# Patient Record
Sex: FEMALE | Race: WHITE | NOT HISPANIC OR LATINO | Employment: UNEMPLOYED | URBAN - METROPOLITAN AREA
[De-identification: names, ages, dates, MRNs, and addresses within clinical notes are randomized per-mention and may not be internally consistent; named-entity substitution may affect disease eponyms.]

---

## 2017-04-11 ENCOUNTER — TELEPHONE (OUTPATIENT)
Dept: OBSTETRICS AND GYNECOLOGY | Facility: CLINIC | Age: 37
End: 2017-04-11

## 2017-04-11 RX ORDER — VALACYCLOVIR HYDROCHLORIDE 500 MG/1
500 TABLET, FILM COATED ORAL 2 TIMES DAILY
Qty: 14 TABLET | Refills: 3 | Status: SHIPPED | OUTPATIENT
Start: 2017-04-11 | End: 2017-04-18

## 2017-04-11 NOTE — TELEPHONE ENCOUNTER
Kaylynn,     Medication sent into pharmacy.   Please let her know.     Thanks   Dr. Demarco    ----- Message from Debbie Jacques sent at 4/11/2017  2:03 PM EDT -----  Contact: Patient   Patient requesting RF for Valtex for her fever blisters.  Patient states can't remember the milligram but takes it once a day until fever blister/symptoms resolve.  Patient states has one on her lip currently and one coming in right above her lip as well.      Thanks,  Debbie

## 2017-12-18 ENCOUNTER — TELEPHONE (OUTPATIENT)
Dept: OBSTETRICS AND GYNECOLOGY | Facility: CLINIC | Age: 37
End: 2017-12-18

## 2017-12-18 RX ORDER — METRONIDAZOLE 500 MG/1
500 TABLET ORAL 2 TIMES DAILY
Qty: 14 TABLET | Refills: 4 | Status: SHIPPED | OUTPATIENT
Start: 2017-12-18 | End: 2017-12-25

## 2017-12-18 NOTE — TELEPHONE ENCOUNTER
Kaylynn,     Medication sent to the pharmacy     Thanks   Dr. Demarco    ----- Message from Chrissie Hernandez sent at 12/18/2017  8:46 AM EST -----  Pt is requesting Rx for BV, please advise.

## 2018-01-16 ENCOUNTER — TELEPHONE (OUTPATIENT)
Dept: OBSTETRICS AND GYNECOLOGY | Facility: CLINIC | Age: 38
End: 2018-01-16

## 2018-01-16 NOTE — TELEPHONE ENCOUNTER
Kaylynn,     One refill sent.     Please let her know.     Thanks   Dr. Demarco    ----- Message from Shona Kam sent at 1/16/2018 11:38 AM EST -----  Contact: Pt  Pt had to r/s annual to 2/16/18.  Requesting one month refill on norgestrel-ethinyl estradiol (LOW-OGESTREL,CRYSELLE) 0.3-30 MG-MCG per tablet to get her through.      Pt # [phone]

## 2018-02-07 ENCOUNTER — TELEPHONE (OUTPATIENT)
Dept: OBSTETRICS AND GYNECOLOGY | Facility: CLINIC | Age: 38
End: 2018-02-07

## 2018-02-07 NOTE — TELEPHONE ENCOUNTER
Chrissie,     Two issues.     1) She needs an annual appointment - overdue at this point  2) No pharmacy in [facility] so you also need a phone number for the drug to even be called in.     Thanks   Dr. Demarco    ----- Message from Chrissie Hernandez sent at 2/7/2018 11:18 AM EST -----  Pt requesting refill on norgestrel-ethinyl estradiol (LOW-OGESTREL,CRYSELLE) 0.3-30 MG-MCG per tablet, please advise.       Kaylynn,     I got an answer back from Chrissie.   Now with appointment for 3/6/18   Need to call in one refill, I do not see pharmacy in chart despite what Chrissie says??    Thanks   Dr. Demarco

## 2018-03-22 ENCOUNTER — TELEPHONE (OUTPATIENT)
Dept: OBSTETRICS AND GYNECOLOGY | Facility: CLINIC | Age: 38
End: 2018-03-22

## 2018-03-22 RX ORDER — ACETAMINOPHEN AND CODEINE PHOSPHATE 120; 12 MG/5ML; MG/5ML
1 SOLUTION ORAL DAILY
Qty: 28 TABLET | Refills: 0 | Status: SHIPPED | OUTPATIENT
Start: 2018-03-22 | End: 2018-07-18

## 2018-03-22 NOTE — TELEPHONE ENCOUNTER
Kaylynn,     I changed her to Micronor.  Last documentation showsw she is a smoker, > 36 years of age. Can not refill OCP at this point.  Needs to be sure and take her micronor at ath same time or if off by more than 4 hours - risk failure.     Please let her know.     Thanks   Dr. Demarco    ----- Message from Debbie Jacques sent at 3/22/2018  9:38 AM EDT -----  Contact: Patient   Patient is requesting a RF of her Low-Ogestrel.  Patient has rescheduled her appointment to 4/16/18.  She has rescheduled numerous times.

## 2018-07-18 ENCOUNTER — OFFICE VISIT (OUTPATIENT)
Dept: OBSTETRICS AND GYNECOLOGY | Facility: CLINIC | Age: 38
End: 2018-07-18

## 2018-07-18 VITALS
BODY MASS INDEX: 37.38 KG/M2 | HEART RATE: 64 BPM | SYSTOLIC BLOOD PRESSURE: 126 MMHG | WEIGHT: 198 LBS | HEIGHT: 61 IN | DIASTOLIC BLOOD PRESSURE: 86 MMHG

## 2018-07-18 DIAGNOSIS — Z01.419 ENCOUNTER FOR GYNECOLOGICAL EXAMINATION WITHOUT ABNORMAL FINDING: Primary | ICD-10-CM

## 2018-07-18 DIAGNOSIS — Z72.0 TOBACCO USE: ICD-10-CM

## 2018-07-18 DIAGNOSIS — N89.8 VAGINAL ODOR: ICD-10-CM

## 2018-07-18 DIAGNOSIS — Z30.013 ENCOUNTER FOR INITIAL PRESCRIPTION OF INJECTABLE CONTRACEPTIVE: ICD-10-CM

## 2018-07-18 PROCEDURE — 99395 PREV VISIT EST AGE 18-39: CPT | Performed by: OBSTETRICS & GYNECOLOGY

## 2018-07-18 PROCEDURE — 99406 BEHAV CHNG SMOKING 3-10 MIN: CPT | Performed by: OBSTETRICS & GYNECOLOGY

## 2018-07-18 RX ORDER — MEDROXYPROGESTERONE ACETATE 150 MG/ML
150 INJECTION, SUSPENSION INTRAMUSCULAR
Qty: 1 ML | Refills: 3 | Status: SHIPPED | OUTPATIENT
Start: 2018-07-18 | End: 2019-12-03

## 2018-07-18 NOTE — PATIENT INSTRUCTIONS
Steps to Quit Smoking  Smoking tobacco can be harmful to your health and can affect almost every organ in your body. Smoking puts you, and those around you, at risk for developing many serious chronic diseases. Quitting smoking is difficult, but it is one of the best things that you can do for your health. It is never too late to quit.  What are the benefits of quitting smoking?  When you quit smoking, you lower your risk of developing serious diseases and conditions, such as:  · Lung cancer or lung disease, such as COPD.  · Heart disease.  · Stroke.  · Heart attack.  · Infertility.  · Osteoporosis and bone fractures.    Additionally, symptoms such as coughing, wheezing, and shortness of breath may get better when you quit. You may also find that you get sick less often because your body is stronger at fighting off colds and infections. If you are pregnant, quitting smoking can help to reduce your chances of having a baby of low birth weight.  How do I get ready to quit?  When you decide to quit smoking, create a plan to make sure that you are successful. Before you quit:  · Pick a date to quit. Set a date within the next two weeks to give you time to prepare.  · Write down the reasons why you are quitting. Keep this list in places where you will see it often, such as on your bathroom mirror or in your car or wallet.  · Identify the people, places, things, and activities that make you want to smoke (triggers) and avoid them. Make sure to take these actions:  ? Throw away all cigarettes at home, at work, and in your car.  ? Throw away smoking accessories, such as ashtrays and lighters.  ? Clean your car and make sure to empty the ashtray.  ? Clean your home, including curtains and carpets.  · Tell your family, friends, and coworkers that you are quitting. Support from your loved ones can make quitting easier.  · Talk with your health care provider about your options for quitting smoking.  · Find out what  treatment options are covered by your health insurance.    What strategies can I use to quit smoking?  Talk with your healthcare provider about different strategies to quit smoking. Some strategies include:  · Quitting smoking altogether instead of gradually lessening how much you smoke over a period of time. Research shows that quitting “cold turkey” is more successful than gradually quitting.  · Attending in-person counseling to help you build problem-solving skills. You are more likely to have success in quitting if you attend several counseling sessions. Even short sessions of 10 minutes can be effective.  · Finding resources and support systems that can help you to quit smoking and remain smoke-free after you quit. These resources are most helpful when you use them often. They can include:  ? Online chats with a counselor.  ? Telephone quitlines.  ? Printed self-help materials.  ? Support groups or group counseling.  ? Text messaging programs.  ? Mobile phone applications.  · Taking medicines to help you quit smoking. (If you are pregnant or breastfeeding, talk with your health care provider first.) Some medicines contain nicotine and some do not. Both types of medicines help with cravings, but the medicines that include nicotine help to relieve withdrawal symptoms. Your health care provider may recommend:  ? Nicotine patches, gum, or lozenges.  ? Nicotine inhalers or sprays.  ? Non-nicotine medicine that is taken by mouth.    Talk with your health care provider about combining strategies, such as taking medicines while you are also receiving in-person counseling. Using these two strategies together makes you more likely to succeed in quitting than if you used either strategy on its own.  If you are pregnant or breastfeeding, talk with your health care provider about finding counseling or other support strategies to quit smoking. Do not take medicine to help you quit smoking unless told to do so by your health  care provider.  What things can I do to make it easier to quit?  Quitting smoking might feel overwhelming at first, but there is a lot that you can do to make it easier. Take these important actions:  · Reach out to your family and friends and ask that they support and encourage you during this time. Call telephone quitlines, reach out to support groups, or work with a counselor for support.  · Ask people who smoke to avoid smoking around you.  · Avoid places that trigger you to smoke, such as bars, parties, or smoke-break areas at work.  · Spend time around people who do not smoke.  · Lessen stress in your life, because stress can be a smoking trigger for some people. To lessen stress, try:  ? Exercising regularly.  ? Deep-breathing exercises.  ? Yoga.  ? Meditating.  ? Performing a body scan. This involves closing your eyes, scanning your body from head to toe, and noticing which parts of your body are particularly tense. Purposefully relax the muscles in those areas.  · Download or purchase mobile phone or tablet apps (applications) that can help you stick to your quit plan by providing reminders, tips, and encouragement. There are many free apps, such as QuitGuide from the CDC (Centers for Disease Control and Prevention). You can find other support for quitting smoking (smoking cessation) through smokefree.gov and other websites.    How will I feel when I quit smoking?  Within the first 24 hours of quitting smoking, you may start to feel some withdrawal symptoms. These symptoms are usually most noticeable 2-3 days after quitting, but they usually do not last beyond 2-3 weeks. Changes or symptoms that you might experience include:  · Mood swings.  · Restlessness, anxiety, or irritation.  · Difficulty concentrating.  · Dizziness.  · Strong cravings for sugary foods in addition to nicotine.  · Mild weight gain.  · Constipation.  · Nausea.  · Coughing or a sore throat.  · Changes in how your medicines work in your  body.  · A depressed mood.  · Difficulty sleeping (insomnia).    After the first 2-3 weeks of quitting, you may start to notice more positive results, such as:  · Improved sense of smell and taste.  · Decreased coughing and sore throat.  · Slower heart rate.  · Lower blood pressure.  · Clearer skin.  · The ability to breathe more easily.  · Fewer sick days.    Quitting smoking is very challenging for most people. Do not get discouraged if you are not successful the first time. Some people need to make many attempts to quit before they achieve long-term success. Do your best to stick to your quit plan, and talk with your health care provider if you have any questions or concerns.  This information is not intended to replace advice given to you by your health care provider. Make sure you discuss any questions you have with your health care provider.  Document Released: 12/12/2002 Document Revised: 08/15/2017 Document Reviewed: 05/03/2016  [employer] Interactive Patient Education © 2018 [employer]

## 2018-07-18 NOTE — PROGRESS NOTES
"GYN Annual Exam     CC- Here for annual exam.     Laisha Alaniz is a 38 y.o. female who presents for annual well woman exam. Periods are regular every 28-30 days, lasting 7 days. Dysmenorrhea:mild, occurring first 1-2 days of flow. Cyclic symptoms include none. No intermenstrual bleeding, spotting, or discharge.   Pt does notice a vaginal odor. Denies any d/c or burning. Pt would like to discuss birth control.     OB History      Para Term  AB Living    3 3 3     3    SAB TAB Ectopic Molar Multiple Live Births            0 3          Current contraception: none  History of abnormal Pap smear: no  Family history of uterine, colon or ovarian cancer: no  History of abnormal mammogram: no  Family history of breast cancer: yes - maternal grandmother   Last Pap : 03/15/2016 NL HPV-    Past Medical History:   Diagnosis Date   • Abnormal Pap smear of cervix    • Chlamydia    • Gonorrhea    • Migraine    • Migraines    • MRSA (methicillin resistant Staphylococcus aureus) 2015   • Varicella        Past Surgical History:   Procedure Laterality Date   •  SECTION     • WISDOM TOOTH EXTRACTION         No current outpatient prescriptions on file.    No Known Allergies    Social History   Substance Use Topics   • Smoking status: Current Every Day Smoker     Packs/day: 0.25     Types: Cigarettes   • Smokeless tobacco: Never Used   • Alcohol use No       Family History   Problem Relation Age of Onset   • Breast cancer Maternal Grandmother    • Ovarian cancer Neg Hx    • Uterine cancer Neg Hx    • Colon cancer Neg Hx    • Deep vein thrombosis Neg Hx    • Pulmonary embolism Neg Hx        Review of Systems   Constitutional: Negative for chills and fever.   Gastrointestinal: Negative for abdominal pain.   Genitourinary: Negative for dysuria, menstrual problem, pelvic pain, vaginal bleeding and vaginal discharge.   All other systems reviewed and are negative.      /86   Pulse 64   Ht 154.9 cm (61\")   Wt " 89.8 kg (198 lb)   LMP 06/27/2018   Breastfeeding? No   BMI 37.41 kg/m²     Physical Exam   Constitutional: She is oriented to person, place, and time. She appears well-developed and well-nourished.   HENT:   Head: Normocephalic and atraumatic.   Eyes: Conjunctivae are normal.   Neck: Normal range of motion. Neck supple. No thyromegaly present.   Cardiovascular: Normal rate and regular rhythm.    No murmur heard.  Pulmonary/Chest: Effort normal and breath sounds normal. Right breast exhibits no inverted nipple, no mass and no nipple discharge. Left breast exhibits no inverted nipple, no mass and no nipple discharge.   Abdominal: Soft. Bowel sounds are normal. She exhibits no distension. There is no tenderness.   Genitourinary: Vagina normal and uterus normal. Pelvic exam was performed with patient supine. There is no lesion on the right labia. There is no lesion on the left labia. Uterus is not enlarged (anteverted ), not fixed and not tender. Cervix exhibits no motion tenderness. Right adnexum displays no mass and no tenderness. Left adnexum displays no mass and no tenderness. No bleeding in the vagina. No vaginal discharge found.   Musculoskeletal: She exhibits no edema.   Lymphadenopathy:        Right: No inguinal adenopathy present.        Left: No inguinal adenopathy present.   Neurological: She is alert and oriented to person, place, and time.   Skin: No rash noted.   Psychiatric: She has a normal mood and affect. Her behavior is normal.          Assessment     1) GYN annual well woman exam.   2) vaginal odor  3) Depo-provera for contraception   Not eligible for OCP due to tobacco use.   Adjustment bleeding, weight gain and amenorrhea reviewed.   4) Tobacco   Tobacco abuse addressed    1) Increases risk for heart disease, COPD and lung cancer   2) many ways to stop including hypnosis, cold turkey, weaning but if interested in   Patches, nicotine gum, or medications like zyban and Chantix will need to see  PCP  3) Recommend weaning as ideal way to try and reduce risk to stop   Typically encourage to set goals every two weeks with a reduction by 50% in use of tobacco. Once down to a few a day, set quit day in the same manor.   4) [place] quit now is a resource available to all.     I spent > 3 min face to face counseling about the use of tobacco abuse and why to quit.           Plan     1) Breast Health - Clinical breast exam yearly, Self breast awareness monthly  2) Pap - up to date   3) Smoking status- see above   4) Seat belts recommended  5) Follow up prn and one year.     Garcia Demarco MD   7/18/2018  9:44 AM

## 2018-07-25 ENCOUNTER — TELEPHONE (OUTPATIENT)
Dept: OBSTETRICS AND GYNECOLOGY | Facility: CLINIC | Age: 38
End: 2018-07-25

## 2018-07-25 LAB
A VAGINAE DNA VAG QL NAA+PROBE: NORMAL SCORE
BVAB2 DNA VAG QL NAA+PROBE: NORMAL SCORE
C ALBICANS DNA VAG QL NAA+PROBE: NEGATIVE
C GLABRATA DNA VAG QL NAA+PROBE: NEGATIVE
C TRACH RRNA SPEC QL NAA+PROBE: NEGATIVE
MEGA1 DNA VAG QL NAA+PROBE: NORMAL SCORE
N GONORRHOEA RRNA SPEC QL NAA+PROBE: NEGATIVE
T VAGINALIS RRNA SPEC QL NAA+PROBE: NEGATIVE

## 2018-07-25 NOTE — TELEPHONE ENCOUNTER
----- Message from Garcia Demarco MD sent at 7/25/2018 11:26 AM EDT -----  Shona, negative vaginal culture please review. Thanks Dr. Demarco

## 2018-08-01 ENCOUNTER — CLINICAL SUPPORT (OUTPATIENT)
Dept: OBSTETRICS AND GYNECOLOGY | Facility: CLINIC | Age: 38
End: 2018-08-01

## 2018-08-01 VITALS
WEIGHT: 199 LBS | HEIGHT: 61 IN | DIASTOLIC BLOOD PRESSURE: 81 MMHG | SYSTOLIC BLOOD PRESSURE: 125 MMHG | HEART RATE: 74 BPM | BODY MASS INDEX: 37.57 KG/M2

## 2018-08-01 DIAGNOSIS — Z30.013 INITIATION OF DEPO PROVERA: Primary | ICD-10-CM

## 2018-08-01 LAB
B-HCG UR QL: NEGATIVE
INTERNAL NEGATIVE CONTROL: NEGATIVE
INTERNAL POSITIVE CONTROL: POSITIVE
Lab: NORMAL

## 2018-08-01 PROCEDURE — 81025 URINE PREGNANCY TEST: CPT | Performed by: OBSTETRICS & GYNECOLOGY

## 2018-08-01 PROCEDURE — 96372 THER/PROPH/DIAG INJ SC/IM: CPT | Performed by: OBSTETRICS & GYNECOLOGY

## 2018-08-01 RX ORDER — MEDROXYPROGESTERONE ACETATE 150 MG/ML
150 INJECTION, SUSPENSION INTRAMUSCULAR ONCE
Status: COMPLETED | OUTPATIENT
Start: 2018-08-01 | End: 2018-08-01

## 2018-08-01 RX ADMIN — MEDROXYPROGESTERONE ACETATE 150 MG: 150 INJECTION, SUSPENSION INTRAMUSCULAR at 09:38

## 2019-12-03 ENCOUNTER — INITIAL PRENATAL (OUTPATIENT)
Dept: OBSTETRICS AND GYNECOLOGY | Facility: CLINIC | Age: 39
End: 2019-12-03

## 2019-12-03 VITALS — DIASTOLIC BLOOD PRESSURE: 77 MMHG | SYSTOLIC BLOOD PRESSURE: 132 MMHG | BODY MASS INDEX: 32.12 KG/M2 | WEIGHT: 170 LBS

## 2019-12-03 DIAGNOSIS — O99.211 OBESITY AFFECTING PREGNANCY IN FIRST TRIMESTER: ICD-10-CM

## 2019-12-03 DIAGNOSIS — O99.331 TOBACCO USE IN PREGNANCY, ANTEPARTUM, FIRST TRIMESTER: ICD-10-CM

## 2019-12-03 DIAGNOSIS — O34.219 PREVIOUS CESAREAN DELIVERY, ANTEPARTUM: ICD-10-CM

## 2019-12-03 DIAGNOSIS — Z11.3 SCREEN FOR STD (SEXUALLY TRANSMITTED DISEASE): ICD-10-CM

## 2019-12-03 DIAGNOSIS — Z12.4 SCREENING FOR CERVICAL CANCER: ICD-10-CM

## 2019-12-03 DIAGNOSIS — Z72.0 TOBACCO USE: ICD-10-CM

## 2019-12-03 DIAGNOSIS — O09.521 AMA (ADVANCED MATERNAL AGE) MULTIGRAVIDA 35+, FIRST TRIMESTER: Primary | ICD-10-CM

## 2019-12-03 PROCEDURE — 99214 OFFICE O/P EST MOD 30 MIN: CPT | Performed by: OBSTETRICS & GYNECOLOGY

## 2019-12-03 RX ORDER — VITAMIN A ACETATE, BETA CAROTENE, ASCORBIC ACID, CHOLECALCIFEROL, .ALPHA.-TOCOPHEROL ACETATE, DL-, THIAMINE MONONITRATE, RIBOFLAVIN, NIACINAMIDE, PYRIDOXINE HYDROCHLORIDE, FOLIC ACID, CYANOCOBALAMIN, CALCIUM CARBONATE, FERROUS FUMARATE, ZINC OXIDE, CUPRIC OXIDE 3080; 12; 120; 400; 1; 1.84; 3; 20; 22; 920; 25; 200; 27; 10; 2 [IU]/1; UG/1; MG/1; [IU]/1; MG/1; MG/1; MG/1; MG/1; MG/1; [IU]/1; MG/1; MG/1; MG/1; MG/1; MG/1
1 TABLET, FILM COATED ORAL DAILY
Qty: 30 TABLET | Refills: 12 | Status: SHIPPED | OUTPATIENT
Start: 2019-12-03 | End: 2020-03-27 | Stop reason: SDUPTHER

## 2019-12-03 RX ORDER — VITAMIN A ACETATE, BETA CAROTENE, ASCORBIC ACID, CHOLECALCIFEROL, .ALPHA.-TOCOPHEROL ACETATE, DL-, THIAMINE MONONITRATE, RIBOFLAVIN, NIACINAMIDE, PYRIDOXINE HYDROCHLORIDE, FOLIC ACID, CYANOCOBALAMIN, CALCIUM CARBONATE, FERROUS FUMARATE, ZINC OXIDE, CUPRIC OXIDE 3080; 12; 120; 400; 1; 1.84; 3; 20; 22; 920; 25; 200; 27; 10; 2 [IU]/1; UG/1; MG/1; [IU]/1; MG/1; MG/1; MG/1; MG/1; MG/1; [IU]/1; MG/1; MG/1; MG/1; MG/1; MG/1
TABLET, FILM COATED ORAL DAILY
COMMUNITY
End: 2019-12-03 | Stop reason: SDUPTHER

## 2019-12-03 RX ORDER — FAMOTIDINE 40 MG/1
TABLET, FILM COATED ORAL
Refills: 0 | COMMUNITY
Start: 2019-10-29 | End: 2020-02-21 | Stop reason: SDUPTHER

## 2019-12-03 NOTE — PROGRESS NOTES
Initial ob visit     CC- Here for care of pregnancy     Laisha Alaniz is being seen today for her first obstetrical visit.  She is a 39 y.o.    12w4d gestation.     #: 1, Date: 03, Sex: Female, Weight: 3742 g (8 lb 4 oz), GA: 40w0d, Delivery: Vaginal, Spontaneous, Apgar1: None, Apgar5: None, Living: Living, Birth Comments: None    #: 2, Date: 11/15/03, Sex: Female, Weight: 3487 g (7 lb 11 oz), GA: 40w0d, Delivery: Vaginal, Spontaneous, Apgar1: None, Apgar5: None, Living: Living, Birth Comments: None    #: 3, Date: 11/10/16, Sex: Female, Weight: 3145 g (6 lb 14.9 oz), GA: 40w2d, Delivery: , Low Transverse, Apgar1: 8, Apgar5: 9, Living: Living, Birth Comments: None    #: 4, Date: None, Sex: None, Weight: None, GA: None, Delivery: None, Apgar1: None, Apgar5: None, Living: None, Birth Comments: None      Current obstetric complaints : acid reflux, recent US showing 13 weeks.   Prior obstetric issues, potential pregnancy concerns:  with G3  Family history of genetic issues (includes FOB): child with an IQ of 49,   Prior infections concerning in pregnancy (Rash, fever in last 2 weeks): none  Varicella Hx -immunity  Prior testing for Cystic Fibrosis Carrier or Sickle Cell Trait- unknown status  Prepregnancy BMI - Body mass index is 32.12 kg/m².    Past Medical History:   Diagnosis Date   • Abnormal Pap smear of cervix    • Chlamydia    • Gonorrhea    • Migraine    • Migraines    • MRSA (methicillin resistant Staphylococcus aureus) 2015   • PID (pelvic inflammatory disease)    • Varicella        Past Surgical History:   Procedure Laterality Date   •  SECTION     • WISDOM TOOTH EXTRACTION           Current Outpatient Medications:   •  famotidine (PEPCID) 40 MG tablet, TK 1 T PO QHS, Disp: , Rfl: 0  •  mupirocin (BACTROBAN) 2 % ointment, APPLY 1 APPLICATION TO AFFECTED AREA TID, Disp: , Rfl: 0  •  prenatal vitamins (PRENATAL 27-) 27-1 MG tablet tablet, Take  by mouth Daily., Disp:  , Rfl:     No Known Allergies    Social History     Socioeconomic History   • Marital status:      Spouse name: Not on file   • Number of children: Not on file   • Years of education: Not on file   • Highest education level: Not on file   Tobacco Use   • Smoking status: Current Every Day Smoker     Packs/day: 0.25     Types: Cigarettes   • Smokeless tobacco: Never Used   Substance and Sexual Activity   • Alcohol use: No   • Drug use: No   • Sexual activity: Yes     Partners: Male     Birth control/protection: None       Family History   Problem Relation Age of Onset   • Breast cancer Maternal Grandmother    • Ovarian cancer Neg Hx    • Uterine cancer Neg Hx    • Colon cancer Neg Hx    • Deep vein thrombosis Neg Hx    • Pulmonary embolism Neg Hx        Review of systems     A comprehensive review of systems was negative.     Objective    /77   Wt 77.1 kg (170 lb)   LMP 09/06/2019 (Exact Date)   BMI 32.12 kg/m²       General Appearance:    Alert, cooperative, in no acute distress, habitus obese   Head:    Normocephalic, without obvious abnormality, atraumatic   Eyes:            Lids and lashes normal, conjunctivae and sclerae normal, no   icterus, no pallor, corneas clear   Ears:    Ears appear intact with no abnormalities noted       Neck:   No adenopathy, supple, trachea midline, no thyromegaly   Back:     No kyphosis present, no scoliosis present,                       Lungs:     Clear to auscultation,respirations regular, even and                   unlabored    Heart:    Regular rhythm and normal rate, normal S1 and S2, no            murmur, no gallop, no rub, no click   Breast Exam:    No masses, No nipple discharge   Abdomen:     Normal bowel sounds, no masses, no organomegaly, soft        non-tender, non-distended, no guarding, no rebound                 tenderness   Genitalia:    Vulva - BUS-WNL, NEFG    Vagina - No discharge, No bleeding    Cervix - No Lesions, closed     Uterus - Consistent  with 12-14 weeks, +FHTs    Adnexa - No mass, NT    Pelvimetry - clinically adequate, gynecoid pelvis     Extremities:   Moves all extremities well, no edema, no cyanosis, no              redness   Pulses:   Pulses palpable and equal bilaterally   Skin:   No bleeding, bruising or rash   Lymph nodes:   No palpable adenopathy   Neurologic:   Sensation intact, A&O times 3     Reviewed ultrasound 19 at [facility]   Consistent with 13 weeks and 2 days, SIUP +FHTs      Assessment    1) Pregnancy at 12w4d  2) AMA   Discussed and to proceed with NIPT   3) Prior    Discussed  vs repeat, possible candidate will assess down the road   4) Obesity in pregnancy   Encouraged to limit gain to 10-20#   5) Tobacco use in pregnancy   Tobacco use in pregnancy addressed   1) Increases risk for IUGR, abruption   2) Infant will be exposed and therefor withdrawal once delivered and the cord is cut  3) Recommend weaning as ideal way to try and reduce risk to stop   Typically encourage to set goals every two weeks with a reduction by 50% in use of tobacco. Once down to a few a day, set quit day in the same manor.            Plan    Initial labs drawn, GC/CHL screen done  Activity recommendation : 150 minutes/week of moderate intensity aerobic activity unless we limit for bleeding, hypertension or other pregnancy complication   Patient is on Prenatal vitamins  Problem list reviewed and updated.  Reviewed routine prenatal care with the office to include but not limited to   Zika (travel restrictions/ok to use insect repellant), not to changing cat litter, food restrictions, avoidance of alcohol, tobacco and drugs and saunas/hot tubs.   All questions answered.     Garcia Demarco MD   12/3/2019  1:11 PM

## 2019-12-03 NOTE — PATIENT INSTRUCTIONS
Steps to Quit Smoking    Smoking tobacco can be harmful to your health and can affect almost every organ in your body. Smoking puts you, and those around you, at risk for developing many serious chronic diseases. Quitting smoking is difficult, but it is one of the best things that you can do for your health. It is never too late to quit.  What are the benefits of quitting smoking?  When you quit smoking, you lower your risk of developing serious diseases and conditions, such as:  · Lung cancer or lung disease, such as COPD.  · Heart disease.  · Stroke.  · Heart attack.  · Infertility.  · Osteoporosis and bone fractures.  Additionally, symptoms such as coughing, wheezing, and shortness of breath may get better when you quit. You may also find that you get sick less often because your body is stronger at fighting off colds and infections. If you are pregnant, quitting smoking can help to reduce your chances of having a baby of low birth weight.  How do I get ready to quit?  When you decide to quit smoking, create a plan to make sure that you are successful. Before you quit:  · Pick a date to quit. Set a date within the next two weeks to give you time to prepare.  · Write down the reasons why you are quitting. Keep this list in places where you will see it often, such as on your bathroom mirror or in your car or wallet.  · Identify the people, places, things, and activities that make you want to smoke (triggers) and avoid them. Make sure to take these actions:  ? Throw away all cigarettes at home, at work, and in your car.  ? Throw away smoking accessories, such as ashtrays and lighters.  ? Clean your car and make sure to empty the ashtray.  ? Clean your home, including curtains and carpets.  · Tell your family, friends, and coworkers that you are quitting. Support from your loved ones can make quitting easier.  · Talk with your health care provider about your options for quitting smoking.  · Find out what  treatment options are covered by your health insurance.  What strategies can I use to quit smoking?  Talk with your healthcare provider about different strategies to quit smoking. Some strategies include:  · Quitting smoking altogether instead of gradually lessening how much you smoke over a period of time. Research shows that quitting “cold turkey” is more successful than gradually quitting.  · Attending in-person counseling to help you build problem-solving skills. You are more likely to have success in quitting if you attend several counseling sessions. Even short sessions of 10 minutes can be effective.  · Finding resources and support systems that can help you to quit smoking and remain smoke-free after you quit. These resources are most helpful when you use them often. They can include:  ? Online chats with a counselor.  ? Telephone quitlines.  ? Printed self-help materials.  ? Support groups or group counseling.  ? Text messaging programs.  ? Mobile phone applications.  · Taking medicines to help you quit smoking. (If you are pregnant or breastfeeding, talk with your health care provider first.) Some medicines contain nicotine and some do not. Both types of medicines help with cravings, but the medicines that include nicotine help to relieve withdrawal symptoms. Your health care provider may recommend:  ? Nicotine patches, gum, or lozenges.  ? Nicotine inhalers or sprays.  ? Non-nicotine medicine that is taken by mouth.  Talk with your health care provider about combining strategies, such as taking medicines while you are also receiving in-person counseling. Using these two strategies together makes you more likely to succeed in quitting than if you used either strategy on its own.  If you are pregnant or breastfeeding, talk with your health care provider about finding counseling or other support strategies to quit smoking. Do not take medicine to help you quit smoking unless told to do so by your health care  provider.  What things can I do to make it easier to quit?  Quitting smoking might feel overwhelming at first, but there is a lot that you can do to make it easier. Take these important actions:  · Reach out to your family and friends and ask that they support and encourage you during this time. Call telephone quitlines, reach out to support groups, or work with a counselor for support.  · Ask people who smoke to avoid smoking around you.  · Avoid places that trigger you to smoke, such as bars, parties, or smoke-break areas at work.  · Spend time around people who do not smoke.  · Lessen stress in your life, because stress can be a smoking trigger for some people. To lessen stress, try:  ? Exercising regularly.  ? Deep-breathing exercises.  ? Yoga.  ? Meditating.  ? Performing a body scan. This involves closing your eyes, scanning your body from head to toe, and noticing which parts of your body are particularly tense. Purposefully relax the muscles in those areas.  · Download or purchase mobile phone or tablet apps (applications) that can help you stick to your quit plan by providing reminders, tips, and encouragement. There are many free apps, such as QuitGuide from the CDC (Centers for Disease Control and Prevention). You can find other support for quitting smoking (smoking cessation) through smokefree.gov and other websites.  How will I feel when I quit smoking?  Within the first 24 hours of quitting smoking, you may start to feel some withdrawal symptoms. These symptoms are usually most noticeable 2-3 days after quitting, but they usually do not last beyond 2-3 weeks. Changes or symptoms that you might experience include:  · Mood swings.  · Restlessness, anxiety, or irritation.  · Difficulty concentrating.  · Dizziness.  · Strong cravings for sugary foods in addition to nicotine.  · Mild weight gain.  · Constipation.  · Nausea.  · Coughing or a sore throat.  · Changes in how your medicines work in your  body.  · A depressed mood.  · Difficulty sleeping (insomnia).  After the first 2-3 weeks of quitting, you may start to notice more positive results, such as:  · Improved sense of smell and taste.  · Decreased coughing and sore throat.  · Slower heart rate.  · Lower blood pressure.  · Clearer skin.  · The ability to breathe more easily.  · Fewer sick days.  Quitting smoking is very challenging for most people. Do not get discouraged if you are not successful the first time. Some people need to make many attempts to quit before they achieve long-term success. Do your best to stick to your quit plan, and talk with your health care provider if you have any questions or concerns.  This information is not intended to replace advice given to you by your health care provider. Make sure you discuss any questions you have with your health care provider.  Document Released: 12/12/2002 Document Revised: 07/24/2018 Document Reviewed: 05/03/2016  [employer] Patient Education © 2019 [employer]

## 2019-12-05 LAB
ABO GROUP BLD: (no result)
BACTERIA UR CULT: NORMAL
BACTERIA UR CULT: NORMAL
BASOPHILS # BLD AUTO: 0 X10E3/UL (ref 0–0.2)
BASOPHILS NFR BLD AUTO: 0 %
BLD GP AB SCN SERPL QL: (no result)
BLD GP AB SCN SERPL QL: NORMAL
BLOOD GROUP ANTIBODIES SERPL: NORMAL
CYTOLOGIST CVX/VAG CYTO: NORMAL
CYTOLOGY CVX/VAG DOC CYTO: NORMAL
CYTOLOGY CVX/VAG DOC THIN PREP: NORMAL
DX ICD CODE: NORMAL
EOSINOPHIL # BLD AUTO: 0.1 X10E3/UL (ref 0–0.4)
EOSINOPHIL NFR BLD AUTO: 1 %
ERYTHROCYTE [DISTWIDTH] IN BLOOD BY AUTOMATED COUNT: 13.5 % (ref 12.3–15.4)
HBV SURFACE AG SERPL QL IA: NEGATIVE
HCT VFR BLD AUTO: 35.2 % (ref 34–46.6)
HCV AB S/CO SERPL IA: <0.1 S/CO RATIO (ref 0–0.9)
HGB BLD-MCNC: 11.9 G/DL (ref 11.1–15.9)
HIV 1 & 2 AB SER-IMP: NORMAL
HIV 1+2 AB+HIV1 P24 AG SERPL QL IA: NON REACTIVE
HPV I/H RISK 1 DNA CVX QL PROBE+SIG AMP: NORMAL
HPV I/H RISK 4 DNA CVX QL PROBE+SIG AMP: NEGATIVE
IMM GRANULOCYTES # BLD AUTO: 0 X10E3/UL (ref 0–0.1)
IMM GRANULOCYTES NFR BLD AUTO: 0 %
LYMPHOCYTES # BLD AUTO: 3.2 X10E3/UL (ref 0.7–3.1)
LYMPHOCYTES NFR BLD AUTO: 29 %
MCH RBC QN AUTO: 30 PG (ref 26.6–33)
MCHC RBC AUTO-ENTMCNC: 33.8 G/DL (ref 31.5–35.7)
MCV RBC AUTO: 89 FL (ref 79–97)
MONOCYTES # BLD AUTO: 0.5 X10E3/UL (ref 0.1–0.9)
MONOCYTES NFR BLD AUTO: 5 %
NEUTROPHILS # BLD AUTO: 7.3 X10E3/UL (ref 1.4–7)
NEUTROPHILS NFR BLD AUTO: 65 %
OTHER STN SPEC: NORMAL
PLATELET # BLD AUTO: 302 X10E3/UL (ref 150–450)
RBC # BLD AUTO: 3.97 X10E6/UL (ref 3.77–5.28)
RH BLD: POSITIVE
RPR SER QL: NON REACTIVE
RUBV IGG SERPL IA-ACNC: 4.53 INDEX
STAT OF ADQ CVX/VAG CYTO-IMP: NORMAL
WBC # BLD AUTO: 11.1 X10E3/UL (ref 3.4–10.8)

## 2019-12-06 LAB
C TRACH RRNA SPEC QL NAA+PROBE: NEGATIVE
N GONORRHOEA RRNA SPEC QL NAA+PROBE: NEGATIVE
T VAGINALIS DNA SPEC QL NAA+PROBE: NEGATIVE

## 2019-12-07 LAB
CFDNA.FET/CFDNA.TOTAL SFR FETUS: NORMAL %
CFDNA.FET/CFDNA.TOTAL SFR FETUS: NORMAL %
CITATION REF LAB TEST: NORMAL
FET CHR 13 TS PLAS.CFDNA QL: NEGATIVE
FET CHR 13+18+21 TS PLAS.CFDNA QL: NORMAL
FET CHR 18 TS PLAS.CFDNA QL: NEGATIVE
FET CHR 21 TS PLAS.CFDNA QL: NEGATIVE
FET Y CHROM PLAS.CFDNA QL: DETECTED
FET Y CHROM PLAS.CFDNA: NORMAL
GA EST FROM CONCEPTION DATE: NORMAL D
GESTATIONAL AGE > 9:: YES
LAB DIRECTOR NAME PROVIDER: NORMAL
LABORATORY COMMENT REPORT: NORMAL
LIMITATIONS OF THE TEST: NORMAL
NOTE: NORMAL
POSITIVE PREDICTIVE VALUE: NORMAL
REF LAB TEST METHOD: NORMAL
SERVICE CMNT 02-IMP: NORMAL
SERVICE CMNT 03-IMP: NORMAL
SERVICE CMNT-IMP: NORMAL
TEST PERFORMANCE INFO SPEC: NORMAL
TEST PERFORMANCE INFO SPEC: NORMAL

## 2019-12-10 ENCOUNTER — TELEPHONE (OUTPATIENT)
Dept: OBSTETRICS AND GYNECOLOGY | Facility: CLINIC | Age: 39
End: 2019-12-10

## 2019-12-10 NOTE — TELEPHONE ENCOUNTER
----- Message from Garcia Demarco MD sent at 12/9/2019 11:03 AM EST -----  Kaylynn, let her know the down syndrome  Test was negative/Normal. Appears Male. Thanks Dr. Demarco

## 2020-01-03 ENCOUNTER — ROUTINE PRENATAL (OUTPATIENT)
Dept: OBSTETRICS AND GYNECOLOGY | Facility: CLINIC | Age: 40
End: 2020-01-03

## 2020-01-03 VITALS — SYSTOLIC BLOOD PRESSURE: 109 MMHG | WEIGHT: 179 LBS | BODY MASS INDEX: 33.82 KG/M2 | DIASTOLIC BLOOD PRESSURE: 65 MMHG

## 2020-01-03 DIAGNOSIS — O34.219 PREVIOUS CESAREAN DELIVERY, ANTEPARTUM: ICD-10-CM

## 2020-01-03 DIAGNOSIS — O09.522 MULTIGRAVIDA OF ADVANCED MATERNAL AGE IN SECOND TRIMESTER: Primary | ICD-10-CM

## 2020-01-03 LAB
GLUCOSE UR STRIP-MCNC: NEGATIVE MG/DL
PROT UR STRIP-MCNC: ABNORMAL MG/DL

## 2020-01-03 PROCEDURE — 99213 OFFICE O/P EST LOW 20 MIN: CPT | Performed by: OBSTETRICS & GYNECOLOGY

## 2020-01-03 NOTE — PROGRESS NOTES
OB follow up     Laisha Alaniz is a 39 y.o.  17w0d being seen today for her obstetrical visit.  Patient reports numbness in arms during the night. . Fetal movement: absent..    Review of Systems  No bleeding, No cramping/contractions     /65   Wt 81.2 kg (179 lb)   LMP 2019 (Exact Date)   BMI 33.82 kg/m²     FHT: 144 BPM   Uterine Size: 17 cm       Assessment    1) pregnancy at 17w0d   Labs and cultures reviewed  Quickening reviewed   Normal down screen   Flu shot recommended   Anatomy in 2-3 weeks at mid-gestation   2) AMA   Normal NIPT   3) Prior        Plan    Reviewed this stage of pregnancy  Problem list updated   Follow up in 2-3 weeks    Garcia Demarco MD   1/3/2020  9:05 AM

## 2020-01-24 ENCOUNTER — PROCEDURE VISIT (OUTPATIENT)
Dept: OBSTETRICS AND GYNECOLOGY | Facility: CLINIC | Age: 40
End: 2020-01-24

## 2020-01-24 ENCOUNTER — ROUTINE PRENATAL (OUTPATIENT)
Dept: OBSTETRICS AND GYNECOLOGY | Facility: CLINIC | Age: 40
End: 2020-01-24

## 2020-01-24 VITALS — BODY MASS INDEX: 34.58 KG/M2 | DIASTOLIC BLOOD PRESSURE: 64 MMHG | WEIGHT: 183 LBS | SYSTOLIC BLOOD PRESSURE: 113 MMHG

## 2020-01-24 DIAGNOSIS — Z36.89 ENCOUNTER FOR FETAL ANATOMIC SURVEY: Primary | ICD-10-CM

## 2020-01-24 DIAGNOSIS — O09.522 AMA (ADVANCED MATERNAL AGE) MULTIGRAVIDA 35+, SECOND TRIMESTER: Primary | ICD-10-CM

## 2020-01-24 DIAGNOSIS — O34.219 PREVIOUS CESAREAN DELIVERY, ANTEPARTUM: ICD-10-CM

## 2020-01-24 LAB
GLUCOSE UR STRIP-MCNC: NEGATIVE MG/DL
PROT UR STRIP-MCNC: ABNORMAL MG/DL

## 2020-01-24 PROCEDURE — 76805 OB US >/= 14 WKS SNGL FETUS: CPT | Performed by: OBSTETRICS & GYNECOLOGY

## 2020-01-24 PROCEDURE — 99213 OFFICE O/P EST LOW 20 MIN: CPT | Performed by: OBSTETRICS & GYNECOLOGY

## 2020-01-24 NOTE — PROGRESS NOTES
OB follow up     Laisha Alaniz is a 39 y.o.  20w0d being seen today for her obstetrical visit.  Patient reports no complaints. Fetal movement: normal.    Review of Systems  No bleeding, No cramping/contractions     /64   Wt 83 kg (183 lb)   LMP 2019 (Exact Date)   BMI 34.58 kg/m²     FHT: 154 BPM   Uterine Size: 19 cm       Assessment    1) pregnancy at 20w0d   Anatomy scan normal.   2) AMA   Normal NIPT and ultrasound   3) Prior    Stable/Repeat at term       Plan    Reviewed this stage of pregnancy  Problem list updated   Follow up in 4 weeks    Garcia Demarco MD   2020  9:24 AM

## 2020-02-21 ENCOUNTER — ROUTINE PRENATAL (OUTPATIENT)
Dept: OBSTETRICS AND GYNECOLOGY | Facility: CLINIC | Age: 40
End: 2020-02-21

## 2020-02-21 VITALS — BODY MASS INDEX: 36.28 KG/M2 | WEIGHT: 192 LBS | SYSTOLIC BLOOD PRESSURE: 116 MMHG | DIASTOLIC BLOOD PRESSURE: 61 MMHG

## 2020-02-21 DIAGNOSIS — R12 HEARTBURN DURING PREGNANCY IN SECOND TRIMESTER: ICD-10-CM

## 2020-02-21 DIAGNOSIS — O34.219 PREVIOUS CESAREAN DELIVERY, ANTEPARTUM: ICD-10-CM

## 2020-02-21 DIAGNOSIS — Z36.9 ANTENATAL SCREENING ENCOUNTER: ICD-10-CM

## 2020-02-21 DIAGNOSIS — O09.522 AMA (ADVANCED MATERNAL AGE) MULTIGRAVIDA 35+, SECOND TRIMESTER: Primary | ICD-10-CM

## 2020-02-21 DIAGNOSIS — O26.892 HEARTBURN DURING PREGNANCY IN SECOND TRIMESTER: ICD-10-CM

## 2020-02-21 LAB
GLUCOSE UR STRIP-MCNC: NEGATIVE MG/DL
PROT UR STRIP-MCNC: ABNORMAL MG/DL

## 2020-02-21 PROCEDURE — 99214 OFFICE O/P EST MOD 30 MIN: CPT | Performed by: OBSTETRICS & GYNECOLOGY

## 2020-02-21 RX ORDER — FAMOTIDINE 40 MG/1
40 TABLET, FILM COATED ORAL NIGHTLY PRN
Qty: 30 TABLET | Refills: 6 | Status: SHIPPED | OUTPATIENT
Start: 2020-02-21 | End: 2020-05-29 | Stop reason: HOSPADM

## 2020-02-21 NOTE — PROGRESS NOTES
OB follow up     Laisah Alaniz is a 39 y.o.  24w0d being seen today for her obstetrical visit.  Patient reports no complaints. Fetal movement: normal.    Review of Systems  No bleeding, No cramping/contractions     /61   Wt 87.1 kg (192 lb)   LMP 2019 (Exact Date)   BMI 36.28 kg/m²     FHT: 144 BPM   Uterine Size: 23 cm       Assessment    1) pregnancy at 24w0d   Rh+, GTT/CBC ordered  2) AMA   Normal NIPT   3) Prior    Schedule next visit   4) Obesity   Watch weight gain   5) Heartburn in pregnancy   Refilled Pepcid     Plan    Reviewed this stage of pregnancy  Problem list updated   Follow up in 3 weeks    Garcia Demarco MD   2020  12:19 PM

## 2020-03-11 ENCOUNTER — TELEPHONE (OUTPATIENT)
Dept: OBSTETRICS AND GYNECOLOGY | Facility: CLINIC | Age: 40
End: 2020-03-11

## 2020-03-11 RX ORDER — METRONIDAZOLE 500 MG/1
500 TABLET ORAL 2 TIMES DAILY
Qty: 14 TABLET | Refills: 4 | Status: SHIPPED | OUTPATIENT
Start: 2020-03-11 | End: 2020-03-18

## 2020-03-11 NOTE — TELEPHONE ENCOUNTER
Orin,     This has been done.     Thanks   Dr. Demarco    Patient would like a medication called in for a bacterial infection.

## 2020-03-12 NOTE — TELEPHONE ENCOUNTER
Tried calling patient to inform her of medication being sent.  Was unable to get ahold of patient.

## 2020-03-13 ENCOUNTER — RESULTS ENCOUNTER (OUTPATIENT)
Dept: OBSTETRICS AND GYNECOLOGY | Facility: CLINIC | Age: 40
End: 2020-03-13

## 2020-03-13 DIAGNOSIS — Z36.9 ANTENATAL SCREENING ENCOUNTER: ICD-10-CM

## 2020-03-23 ENCOUNTER — ROUTINE PRENATAL (OUTPATIENT)
Dept: OBSTETRICS AND GYNECOLOGY | Facility: CLINIC | Age: 40
End: 2020-03-23

## 2020-03-23 VITALS — SYSTOLIC BLOOD PRESSURE: 140 MMHG | BODY MASS INDEX: 36.84 KG/M2 | DIASTOLIC BLOOD PRESSURE: 74 MMHG | WEIGHT: 195 LBS

## 2020-03-23 DIAGNOSIS — O09.523 AMA (ADVANCED MATERNAL AGE) MULTIGRAVIDA 35+, THIRD TRIMESTER: Primary | ICD-10-CM

## 2020-03-23 DIAGNOSIS — O34.219 PREVIOUS CESAREAN DELIVERY, ANTEPARTUM: ICD-10-CM

## 2020-03-23 PROBLEM — O09.522 AMA (ADVANCED MATERNAL AGE) MULTIGRAVIDA 35+, SECOND TRIMESTER: Status: ACTIVE | Noted: 2020-03-23

## 2020-03-23 LAB
GLUCOSE UR STRIP-MCNC: NEGATIVE MG/DL
PROT UR STRIP-MCNC: ABNORMAL MG/DL

## 2020-03-23 PROCEDURE — 99213 OFFICE O/P EST LOW 20 MIN: CPT | Performed by: OBSTETRICS & GYNECOLOGY

## 2020-03-23 RX ORDER — MISOPROSTOL 100 UG/1
800 TABLET ORAL AS NEEDED
Status: CANCELLED | OUTPATIENT
Start: 2020-03-23

## 2020-03-23 RX ORDER — OXYTOCIN 10 [USP'U]/ML
125 INJECTION, SOLUTION INTRAMUSCULAR; INTRAVENOUS CONTINUOUS PRN
Status: CANCELLED | OUTPATIENT
Start: 2020-03-23

## 2020-03-23 RX ORDER — OXYTOCIN 10 [USP'U]/ML
999 INJECTION, SOLUTION INTRAMUSCULAR; INTRAVENOUS ONCE
Status: CANCELLED | OUTPATIENT
Start: 2020-03-23

## 2020-03-23 RX ORDER — OXYTOCIN 10 [USP'U]/ML
250 INJECTION, SOLUTION INTRAMUSCULAR; INTRAVENOUS CONTINUOUS
Status: CANCELLED | OUTPATIENT
Start: 2020-03-23 | End: 2020-03-23

## 2020-03-23 RX ORDER — SODIUM CHLORIDE 0.9 % (FLUSH) 0.9 %
10 SYRINGE (ML) INJECTION AS NEEDED
Status: CANCELLED | OUTPATIENT
Start: 2020-03-23

## 2020-03-23 RX ORDER — SODIUM CHLORIDE 0.9 % (FLUSH) 0.9 %
3 SYRINGE (ML) INJECTION EVERY 12 HOURS SCHEDULED
Status: CANCELLED | OUTPATIENT
Start: 2020-03-23

## 2020-03-23 RX ORDER — SODIUM CHLORIDE, SODIUM LACTATE, POTASSIUM CHLORIDE, CALCIUM CHLORIDE 600; 310; 30; 20 MG/100ML; MG/100ML; MG/100ML; MG/100ML
125 INJECTION, SOLUTION INTRAVENOUS CONTINUOUS
Status: CANCELLED | OUTPATIENT
Start: 2020-03-23

## 2020-03-23 RX ORDER — CARBOPROST TROMETHAMINE 250 UG/ML
250 INJECTION, SOLUTION INTRAMUSCULAR AS NEEDED
Status: CANCELLED | OUTPATIENT
Start: 2020-03-23

## 2020-03-23 RX ORDER — METHYLERGONOVINE MALEATE 0.2 MG/ML
200 INJECTION INTRAVENOUS ONCE AS NEEDED
Status: CANCELLED | OUTPATIENT
Start: 2020-03-23

## 2020-03-23 RX ORDER — LIDOCAINE HYDROCHLORIDE 10 MG/ML
5 INJECTION, SOLUTION EPIDURAL; INFILTRATION; INTRACAUDAL; PERINEURAL AS NEEDED
Status: CANCELLED | OUTPATIENT
Start: 2020-03-23

## 2020-03-23 NOTE — PROGRESS NOTES
OB follow up     Laisha Alaniz is a 39 y.o.  28w3d being seen today for her obstetrical visit.  Patient reports no complaints. Fetal movement: normal.    Her prenatal care is complicated by (and status): AMA, Prior  and Obesity in pregnancy     Review of Systems  Cramping/contractions : none   Vaginal bleeding: none   Fetal movement good     /74   Wt 88.5 kg (195 lb)   LMP 2019 (Exact Date)   BMI 36.84 kg/m²     FHT: 138 BPM   Uterine Size: 28 cm       Assessment    1) pregnancy at 28w3d   Rh+, to do GTT/CBC   Peds, prenatal classes and tours   TDaP and flu recommended   Questions about L&D, anesthesia, breast feeding and birth control   2) AMA   NIPT normal   3) prior    Set repeat for 2020 -   Encouraged to consider signing tubal papers         Plan    Reviewed this stage of pregnancy  Problem list updated   Follow up in 2 weeks    Garcia Demarco MD   3/23/2020  16:03

## 2020-03-24 PROBLEM — O09.523 AMA (ADVANCED MATERNAL AGE) MULTIGRAVIDA 35+, THIRD TRIMESTER: Status: ACTIVE | Noted: 2020-03-24

## 2020-03-24 PROBLEM — O34.219 PREVIOUS CESAREAN DELIVERY, ANTEPARTUM: Status: ACTIVE | Noted: 2020-03-24

## 2020-03-26 ENCOUNTER — TELEPHONE (OUTPATIENT)
Dept: OBSTETRICS AND GYNECOLOGY | Facility: CLINIC | Age: 40
End: 2020-03-26

## 2020-03-27 RX ORDER — VITAMIN A ACETATE, BETA CAROTENE, ASCORBIC ACID, CHOLECALCIFEROL, .ALPHA.-TOCOPHEROL ACETATE, DL-, THIAMINE MONONITRATE, RIBOFLAVIN, NIACINAMIDE, PYRIDOXINE HYDROCHLORIDE, FOLIC ACID, CYANOCOBALAMIN, CALCIUM CARBONATE, FERROUS FUMARATE, ZINC OXIDE, CUPRIC OXIDE 3080; 12; 120; 400; 1; 1.84; 3; 20; 22; 920; 25; 200; 27; 10; 2 [IU]/1; UG/1; MG/1; [IU]/1; MG/1; MG/1; MG/1; MG/1; MG/1; [IU]/1; MG/1; MG/1; MG/1; MG/1; MG/1
1 TABLET, FILM COATED ORAL DAILY
Qty: 30 TABLET | Refills: 12 | Status: SHIPPED | OUTPATIENT
Start: 2020-03-27 | End: 2021-02-22

## 2020-04-20 ENCOUNTER — TELEPHONE (OUTPATIENT)
Dept: OBSTETRICS AND GYNECOLOGY | Facility: CLINIC | Age: 40
End: 2020-04-20

## 2020-04-20 NOTE — TELEPHONE ENCOUNTER
Kaylynn,     I think she always has her daughter. If she can not find anyone to sit with her daughter that is fine to bring her. Ideally if she can get another adult, they can stay in the care and we can call her in for her visit.     Thanks,   Dr. Demarco

## 2020-04-20 NOTE — TELEPHONE ENCOUNTER
Dr. Demarco,    I called patient to prescreen her, at the end of the visit I had advised her at this time we would not be allowing guests to accompany her to her appointment. She stated she was informed last week by someone in the office she could bring her daughter to the appointment. Her appointment notes from last week are a little confusing. I tried to contact Anastacia and could reach her, I wasn't sure if you had mad an exception for this patient or not.     I told the patient I would have to check with you and someone would get back to her. I am getting ready to leave for the day. If you could have Jennifer follow up with the patient on whether or not she could bring her daughter or if she needs to try and make arrangements.     Thank you!     # [phone]

## 2020-04-21 ENCOUNTER — ROUTINE PRENATAL (OUTPATIENT)
Dept: OBSTETRICS AND GYNECOLOGY | Facility: CLINIC | Age: 40
End: 2020-04-21

## 2020-04-21 VITALS — SYSTOLIC BLOOD PRESSURE: 134 MMHG | DIASTOLIC BLOOD PRESSURE: 71 MMHG | BODY MASS INDEX: 37.22 KG/M2 | WEIGHT: 197 LBS

## 2020-04-21 DIAGNOSIS — O34.219 PREVIOUS CESAREAN DELIVERY, ANTEPARTUM: ICD-10-CM

## 2020-04-21 DIAGNOSIS — O26.843 FUNDAL HEIGHT LOW FOR DATES IN THIRD TRIMESTER: ICD-10-CM

## 2020-04-21 DIAGNOSIS — O09.523 AMA (ADVANCED MATERNAL AGE) MULTIGRAVIDA 35+, THIRD TRIMESTER: Primary | ICD-10-CM

## 2020-04-21 LAB
GLUCOSE UR STRIP-MCNC: NEGATIVE MG/DL
PROT UR STRIP-MCNC: ABNORMAL MG/DL

## 2020-04-21 PROCEDURE — 99213 OFFICE O/P EST LOW 20 MIN: CPT | Performed by: OBSTETRICS & GYNECOLOGY

## 2020-04-21 NOTE — PROGRESS NOTES
OB follow up     Laisha Alaniz is a 39 y.o.  32w4d being seen today for her obstetrical visit.  Patient reports no complaints and pressure. Fetal movement: normal.    Her prenatal care is complicated by (and status): AMA, prior  and obesity     Review of Systems  Cramping/contractions : none   Vaginal bleeding: none   Fetal movement good     /71   Wt 89.4 kg (197 lb)   LMP 2019 (Exact Date)   BMI 37.22 kg/m²     FHT: 144 BPM   Uterine Size: 30 cm       Assessment    1) pregnancy at 32w4d   Still needs to do 1 hour.   Okay to do with kid (allow to sit in car during test or allow child in waiting room)   2) Prior    Scheduled   3) FH low   Check ultrasound for growth   4) AMA       Plan    Reviewed this stage of pregnancy  Problem list updated   Follow up in 2 weeks    Garcia Demarco MD   2020  15:36

## 2020-05-18 ENCOUNTER — PROCEDURE VISIT (OUTPATIENT)
Dept: OBSTETRICS AND GYNECOLOGY | Facility: CLINIC | Age: 40
End: 2020-05-18

## 2020-05-18 ENCOUNTER — ROUTINE PRENATAL (OUTPATIENT)
Dept: OBSTETRICS AND GYNECOLOGY | Facility: CLINIC | Age: 40
End: 2020-05-18

## 2020-05-18 VITALS — DIASTOLIC BLOOD PRESSURE: 78 MMHG | WEIGHT: 201 LBS | BODY MASS INDEX: 37.98 KG/M2 | SYSTOLIC BLOOD PRESSURE: 126 MMHG

## 2020-05-18 DIAGNOSIS — O40.9XX0 AFI (AMNIOTIC FLUID INDEX) INCREASED: ICD-10-CM

## 2020-05-18 DIAGNOSIS — Z36.9 ANTENATAL SCREENING ENCOUNTER: ICD-10-CM

## 2020-05-18 DIAGNOSIS — O36.60X0 EXCESSIVE FETAL GROWTH AFFECTING MANAGEMENT OF PREGNANCY, ANTEPARTUM, SINGLE OR UNSPECIFIED FETUS: ICD-10-CM

## 2020-05-18 DIAGNOSIS — O34.219 PREVIOUS CESAREAN DELIVERY, ANTEPARTUM: ICD-10-CM

## 2020-05-18 DIAGNOSIS — Z36.89 ENCOUNTER FOR ULTRASOUND TO CHECK FETAL GROWTH: ICD-10-CM

## 2020-05-18 DIAGNOSIS — O09.523 AMA (ADVANCED MATERNAL AGE) MULTIGRAVIDA 35+, THIRD TRIMESTER: Primary | ICD-10-CM

## 2020-05-18 DIAGNOSIS — O26.843 FUNDAL HEIGHT LOW FOR DATES IN THIRD TRIMESTER: ICD-10-CM

## 2020-05-18 LAB
GLUCOSE UR STRIP-MCNC: NEGATIVE MG/DL
PROT UR STRIP-MCNC: ABNORMAL MG/DL

## 2020-05-18 PROCEDURE — 76816 OB US FOLLOW-UP PER FETUS: CPT | Performed by: OBSTETRICS & GYNECOLOGY

## 2020-05-18 PROCEDURE — 99214 OFFICE O/P EST MOD 30 MIN: CPT | Performed by: OBSTETRICS & GYNECOLOGY

## 2020-05-18 RX ORDER — LANCETS 30 GAUGE
1 EACH MISCELLANEOUS 4 TIMES DAILY
Qty: 120 EACH | Refills: 3 | Status: SHIPPED | OUTPATIENT
Start: 2020-05-18 | End: 2021-02-22

## 2020-05-18 RX ORDER — BLOOD-GLUCOSE METER
1 KIT MISCELLANEOUS 4 TIMES DAILY
Qty: 1 EACH | Refills: 0 | Status: SHIPPED | OUTPATIENT
Start: 2020-05-18 | End: 2021-02-22

## 2020-05-18 RX ORDER — GLUCOSAMINE HCL/CHONDROITIN SU 500-400 MG
1 CAPSULE ORAL 4 TIMES DAILY
Qty: 120 EACH | Refills: 3 | Status: SHIPPED | OUTPATIENT
Start: 2020-05-18 | End: 2020-05-29 | Stop reason: HOSPADM

## 2020-05-18 NOTE — PROGRESS NOTES
Ob follow up    Laisha Alaniz is a 40 y.o.  36w3d patient being seen today for her obstetrical visit. Patient reports no complaints. Fetal movement: normal.    Her prenatal care is complicated by (and status) : AMA, Prior       ROS -   Fetal Movement Good   Vaginal bleeding none   Cramping/Contractions None      /78   Wt 91.2 kg (201 lb)   LMP 2019 (Exact Date)   BMI 37.98 kg/m²     FHT:  134 BPM    Uterine Size: 34 cm   Presentations: cephalic   Pelvic Exam:     Dilation: 1cm    Effacement: 50%    Station:  -2                 Assessment    1) Pregnancy at 36w3d  2) Fetal status reassuring   3) GBS status - done today  4) Prior    Repeat set for 39 weeks   5) LGA   Growth 79%, A/C 98%, LI 20.8 cm and cephalic   Did not do 1 hour   So will check HgA1c and check Fasting and 2 hour post parandial sugars for next week to review.   Start weekly BPP   6) Increased LI   See above   7) AMA     Reviewed changes based on COVID-19 on L&D, specifically Stop work by 37 weeks, just patient to OB ED for evaluation - can have one support person in labor and Coronavirus testing for active labor patients.      Plan    Labor warnings   [facility]        Gracia Demarco MD   2020  13:54    
Patient/Caregiver provided printed discharge information.

## 2020-05-19 LAB
BASOPHILS # BLD AUTO: 0 X10E3/UL (ref 0–0.2)
BASOPHILS NFR BLD AUTO: 0 %
EOSINOPHIL # BLD AUTO: 0.1 X10E3/UL (ref 0–0.4)
EOSINOPHIL NFR BLD AUTO: 1 %
ERYTHROCYTE [DISTWIDTH] IN BLOOD BY AUTOMATED COUNT: 13.1 % (ref 11.7–15.4)
GLUCOSE 1H P 50 G GLC PO SERPL-MCNC: 126 MG/DL (ref 65–139)
HCT VFR BLD AUTO: 35.6 % (ref 34–46.6)
HGB BLD-MCNC: 12 G/DL (ref 11.1–15.9)
IMM GRANULOCYTES # BLD AUTO: 0.1 X10E3/UL (ref 0–0.1)
IMM GRANULOCYTES NFR BLD AUTO: 1 %
LYMPHOCYTES # BLD AUTO: 2.3 X10E3/UL (ref 0.7–3.1)
LYMPHOCYTES NFR BLD AUTO: 20 %
MCH RBC QN AUTO: 30.4 PG (ref 26.6–33)
MCHC RBC AUTO-ENTMCNC: 33.7 G/DL (ref 31.5–35.7)
MCV RBC AUTO: 90 FL (ref 79–97)
MONOCYTES # BLD AUTO: 0.5 X10E3/UL (ref 0.1–0.9)
MONOCYTES NFR BLD AUTO: 4 %
NEUTROPHILS # BLD AUTO: 8.7 X10E3/UL (ref 1.4–7)
NEUTROPHILS NFR BLD AUTO: 74 %
PLATELET # BLD AUTO: 271 X10E3/UL (ref 150–450)
RBC # BLD AUTO: 3.95 X10E6/UL (ref 3.77–5.28)
WBC # BLD AUTO: 11.7 X10E3/UL (ref 3.4–10.8)

## 2020-05-20 ENCOUNTER — TELEPHONE (OUTPATIENT)
Dept: OBSTETRICS AND GYNECOLOGY | Facility: CLINIC | Age: 40
End: 2020-05-20

## 2020-05-20 LAB — GP B STREP DNA SPEC QL NAA+PROBE: NEGATIVE

## 2020-05-20 NOTE — TELEPHONE ENCOUNTER
----- Message from Garcia Demarco MD sent at 5/19/2020  4:05 PM EDT -----  Kaylynn, her tests from yesterday (1 hour and CBC look good) - Can you see if [facility] can do the HgA1c I ordered (must not have drawn it with her other tests??) Thanks, Dr. Demarco

## 2020-05-21 LAB — HBA1C MFR BLD: 5.1 % (ref 4.8–5.6)

## 2020-05-26 ENCOUNTER — TELEPHONE (OUTPATIENT)
Dept: OBSTETRICS AND GYNECOLOGY | Facility: CLINIC | Age: 40
End: 2020-05-26

## 2020-05-26 NOTE — TELEPHONE ENCOUNTER
----- Message from Garcia Demarco MD sent at 5/21/2020  7:42 AM EDT -----  Kaylynn, does not appear to be diabetic. Her 1 hour was normal and HgA1c - normal - So please let her know. Thanks, Dr. Demarco

## 2020-05-27 ENCOUNTER — HOSPITAL ENCOUNTER (INPATIENT)
Facility: HOSPITAL | Age: 40
LOS: 2 days | Discharge: HOME | End: 2020-05-29
Attending: OBSTETRICS & GYNECOLOGY | Admitting: OBSTETRICS & GYNECOLOGY

## 2020-05-27 ENCOUNTER — ANESTHESIA EVENT (OUTPATIENT)
Dept: LABOR AND DELIVERY | Facility: HOSPITAL | Age: 40
End: 2020-05-27

## 2020-05-27 ENCOUNTER — ANESTHESIA (OUTPATIENT)
Dept: LABOR AND DELIVERY | Facility: HOSPITAL | Age: 40
End: 2020-05-27

## 2020-05-27 ENCOUNTER — TELEPHONE (OUTPATIENT)
Dept: OBSTETRICS AND GYNECOLOGY | Facility: CLINIC | Age: 40
End: 2020-05-27

## 2020-05-27 DIAGNOSIS — O34.219 VBAC, DELIVERED: Primary | ICD-10-CM

## 2020-05-27 PROBLEM — Z34.90 PREGNANCY: Status: ACTIVE | Noted: 2020-05-27

## 2020-05-27 LAB
ABO GROUP BLD: NORMAL
ANTI-LITTLE C: NORMAL
BASOPHILS # BLD AUTO: 0.02 10*3/MM3 (ref 0–0.2)
BASOPHILS NFR BLD AUTO: 0.2 % (ref 0–1.5)
BLD GP AB SCN SERPL QL: POSITIVE
DEPRECATED RDW RBC AUTO: 44.1 FL (ref 37–54)
EOSINOPHIL # BLD AUTO: 0.09 10*3/MM3 (ref 0–0.4)
EOSINOPHIL NFR BLD AUTO: 0.8 % (ref 0.3–6.2)
ERYTHROCYTE [DISTWIDTH] IN BLOOD BY AUTOMATED COUNT: 13.4 % (ref 12.3–15.4)
HCT VFR BLD AUTO: 32.9 % (ref 34–46.6)
HGB BLD-MCNC: 11.3 G/DL (ref 12–15.9)
IMM GRANULOCYTES # BLD AUTO: 0.08 10*3/MM3 (ref 0–0.05)
IMM GRANULOCYTES NFR BLD AUTO: 0.7 % (ref 0–0.5)
LITTLE C: NEGATIVE
LYMPHOCYTES # BLD AUTO: 1.82 10*3/MM3 (ref 0.7–3.1)
LYMPHOCYTES NFR BLD AUTO: 16.2 % (ref 19.6–45.3)
MCH RBC QN AUTO: 30.8 PG (ref 26.6–33)
MCHC RBC AUTO-ENTMCNC: 34.3 G/DL (ref 31.5–35.7)
MCV RBC AUTO: 89.6 FL (ref 79–97)
MONOCYTES # BLD AUTO: 0.63 10*3/MM3 (ref 0.1–0.9)
MONOCYTES NFR BLD AUTO: 5.6 % (ref 5–12)
NEUTROPHILS # BLD AUTO: 8.62 10*3/MM3 (ref 1.7–7)
NEUTROPHILS NFR BLD AUTO: 76.5 % (ref 42.7–76)
NRBC BLD AUTO-RTO: 0 /100 WBC (ref 0–0.2)
PLATELET # BLD AUTO: 218 10*3/MM3 (ref 140–450)
PMV BLD AUTO: 9.9 FL (ref 6–12)
RBC # BLD AUTO: 3.67 10*6/MM3 (ref 3.77–5.28)
RH BLD: POSITIVE
SARS-COV-2 RNA RESP QL NAA+PROBE: NOT DETECTED
T&S EXPIRATION DATE: NORMAL
WBC NRBC COR # BLD: 11.26 10*3/MM3 (ref 3.4–10.8)

## 2020-05-27 PROCEDURE — 99201: CPT

## 2020-05-27 PROCEDURE — 86902 BLOOD TYPE ANTIGEN DONOR EA: CPT

## 2020-05-27 PROCEDURE — 86920 COMPATIBILITY TEST SPIN: CPT

## 2020-05-27 PROCEDURE — 86850 RBC ANTIBODY SCREEN: CPT | Performed by: OBSTETRICS & GYNECOLOGY

## 2020-05-27 PROCEDURE — 87635 SARS-COV-2 COVID-19 AMP PRB: CPT | Performed by: OBSTETRICS & GYNECOLOGY

## 2020-05-27 PROCEDURE — 86870 RBC ANTIBODY IDENTIFICATION: CPT | Performed by: OBSTETRICS & GYNECOLOGY

## 2020-05-27 PROCEDURE — 86901 BLOOD TYPING SEROLOGIC RH(D): CPT | Performed by: OBSTETRICS & GYNECOLOGY

## 2020-05-27 PROCEDURE — 86900 BLOOD TYPING SEROLOGIC ABO: CPT | Performed by: OBSTETRICS & GYNECOLOGY

## 2020-05-27 PROCEDURE — 86922 COMPATIBILITY TEST ANTIGLOB: CPT

## 2020-05-27 PROCEDURE — C1755 CATHETER, INTRASPINAL: HCPCS | Performed by: ANESTHESIOLOGY

## 2020-05-27 PROCEDURE — 86905 BLOOD TYPING RBC ANTIGENS: CPT | Performed by: OBSTETRICS & GYNECOLOGY

## 2020-05-27 PROCEDURE — 85025 COMPLETE CBC W/AUTO DIFF WBC: CPT | Performed by: OBSTETRICS & GYNECOLOGY

## 2020-05-27 PROCEDURE — C1755 CATHETER, INTRASPINAL: HCPCS

## 2020-05-27 RX ORDER — ONDANSETRON 4 MG/1
4 TABLET, FILM COATED ORAL EVERY 6 HOURS PRN
Status: DISCONTINUED | OUTPATIENT
Start: 2020-05-27 | End: 2020-05-28 | Stop reason: HOSPADM

## 2020-05-27 RX ORDER — SODIUM CHLORIDE 0.9 % (FLUSH) 0.9 %
3 SYRINGE (ML) INJECTION EVERY 12 HOURS SCHEDULED
Status: DISCONTINUED | OUTPATIENT
Start: 2020-05-27 | End: 2020-05-28 | Stop reason: HOSPADM

## 2020-05-27 RX ORDER — TERBUTALINE SULFATE 1 MG/ML
0.25 INJECTION, SOLUTION SUBCUTANEOUS AS NEEDED
Status: DISCONTINUED | OUTPATIENT
Start: 2020-05-27 | End: 2020-05-28 | Stop reason: HOSPADM

## 2020-05-27 RX ORDER — OXYTOCIN-SODIUM CHLORIDE 0.9% IV SOLN 30 UNIT/500ML 30-0.9/5 UT/ML-%
1-20 SOLUTION INTRAVENOUS
Status: DISCONTINUED | OUTPATIENT
Start: 2020-05-27 | End: 2020-05-28

## 2020-05-27 RX ORDER — LIDOCAINE HYDROCHLORIDE AND EPINEPHRINE 15; 5 MG/ML; UG/ML
INJECTION, SOLUTION EPIDURAL AS NEEDED
Status: DISCONTINUED | OUTPATIENT
Start: 2020-05-27 | End: 2020-05-28 | Stop reason: SURG

## 2020-05-27 RX ORDER — EPHEDRINE SULFATE 50 MG/ML
10 INJECTION, SOLUTION INTRAVENOUS
Status: DISCONTINUED | OUTPATIENT
Start: 2020-05-27 | End: 2020-05-28 | Stop reason: HOSPADM

## 2020-05-27 RX ORDER — LIDOCAINE HYDROCHLORIDE 10 MG/ML
5 INJECTION, SOLUTION EPIDURAL; INFILTRATION; INTRACAUDAL; PERINEURAL AS NEEDED
Status: DISCONTINUED | OUTPATIENT
Start: 2020-05-27 | End: 2020-05-28 | Stop reason: HOSPADM

## 2020-05-27 RX ORDER — SODIUM CHLORIDE 0.9 % (FLUSH) 0.9 %
10 SYRINGE (ML) INJECTION AS NEEDED
Status: DISCONTINUED | OUTPATIENT
Start: 2020-05-27 | End: 2020-05-28 | Stop reason: HOSPADM

## 2020-05-27 RX ORDER — SODIUM CHLORIDE, SODIUM LACTATE, POTASSIUM CHLORIDE, CALCIUM CHLORIDE 600; 310; 30; 20 MG/100ML; MG/100ML; MG/100ML; MG/100ML
125 INJECTION, SOLUTION INTRAVENOUS CONTINUOUS
Status: DISCONTINUED | OUTPATIENT
Start: 2020-05-27 | End: 2020-05-28

## 2020-05-27 RX ORDER — ONDANSETRON 2 MG/ML
4 INJECTION INTRAMUSCULAR; INTRAVENOUS EVERY 6 HOURS PRN
Status: DISCONTINUED | OUTPATIENT
Start: 2020-05-27 | End: 2020-05-28 | Stop reason: HOSPADM

## 2020-05-27 RX ADMIN — Medication 8 ML/HR: at 14:20

## 2020-05-27 RX ADMIN — EPHEDRINE SULFATE 10 MG: 50 INJECTION INTRAVENOUS at 15:27

## 2020-05-27 RX ADMIN — OXYTOCIN 2 MILLI-UNITS/MIN: 10 INJECTION INTRAVENOUS at 21:59

## 2020-05-27 RX ADMIN — SODIUM CHLORIDE, POTASSIUM CHLORIDE, SODIUM LACTATE AND CALCIUM CHLORIDE 125 ML/HR: 600; 310; 30; 20 INJECTION, SOLUTION INTRAVENOUS at 14:16

## 2020-05-27 RX ADMIN — SODIUM CHLORIDE, POTASSIUM CHLORIDE, SODIUM LACTATE AND CALCIUM CHLORIDE 1000 ML: 600; 310; 30; 20 INJECTION, SOLUTION INTRAVENOUS at 13:14

## 2020-05-27 RX ADMIN — SODIUM CHLORIDE, POTASSIUM CHLORIDE, SODIUM LACTATE AND CALCIUM CHLORIDE 125 ML/HR: 600; 310; 30; 20 INJECTION, SOLUTION INTRAVENOUS at 20:40

## 2020-05-27 RX ADMIN — LIDOCAINE HYDROCHLORIDE AND EPINEPHRINE 5 ML: 15; 5 INJECTION, SOLUTION EPIDURAL at 14:10

## 2020-05-27 NOTE — TELEPHONE ENCOUNTER
Patient states that she thinks she is going into labor. She states that contractions are about 6-7 mins apart lasting about 45 seconds each. She states that she is also starting to sweat like the body is trying to tolerate the worsening pain. I informed her and her  that she needed to go to Labor and Delivery.

## 2020-05-27 NOTE — ANESTHESIA PREPROCEDURE EVALUATION
Anesthesia Evaluation     Patient summary reviewed and Nursing notes reviewed   NPO Solid Status: > 8 hours  NPO Liquid Status: > 8 hours           Airway   Mallampati: II  Neck ROM: full  No difficulty expected  Dental - normal exam     Pulmonary     breath sounds clear to auscultation  (+) a smoker Current Smoked day of surgery,   Cardiovascular     Rhythm: regular        Neuro/Psych  (+) headaches,     GI/Hepatic/Renal/Endo    (+) obesity,       Musculoskeletal     Abdominal   (+) obese,    Substance History      OB/GYN    (+) Pregnant,         Other                        Anesthesia Plan    ASA 2     epidural   (  37w5d  )    Anesthetic plan, all risks, benefits, and alternatives have been provided, discussed and informed consent has been obtained with: patient.

## 2020-05-27 NOTE — TELEPHONE ENCOUNTER
Called by Dr. Preston my partner. Likely in labor and considering .      Garcia Demarco MD  2020  12:50

## 2020-05-27 NOTE — ANESTHESIA PROCEDURE NOTES
Labor Epidural      Patient location during procedure: OB  Start Time: 5/27/2020 2:00 PM  Stop Time: 5/27/2020 2:25 PM  Performed By  Anesthesiologist: Ector Deleon MD  Preanesthetic Checklist  Completed: patient identified, site marked, surgical consent, pre-op evaluation, timeout performed, IV checked, risks and benefits discussed and monitors and equipment checked  Prep:  Pt Position:sitting  Sterile Tech:cap, gloves, mask and sterile barrier  Prep:povidone-iodine 7.5% surgical scrub  Monitoring:blood pressure monitoring, continuous pulse oximetry and EKG  Epidural Block Procedure:  Approach:midline  Guidance:landmark technique and palpation technique  Location:L3-L4  Needle Type:Tuohy  Needle Gauge:17  Loss of Resistance Medium: air  Paresthesia: none  Aspiration:negative  Test Dose:negative  Number of Attempts: 1  Post Assessment:  Dressing:occlusive dressing applied and secured with tape  Pt Tolerance:patient tolerated the procedure well with no apparent complications  Complications:no

## 2020-05-28 PROBLEM — O34.219 VBAC, DELIVERED: Status: ACTIVE | Noted: 2020-05-28

## 2020-05-28 PROCEDURE — 88307 TISSUE EXAM BY PATHOLOGIST: CPT

## 2020-05-28 PROCEDURE — 0UQG7ZZ REPAIR VAGINA, VIA NATURAL OR ARTIFICIAL OPENING: ICD-10-PCS | Performed by: OBSTETRICS & GYNECOLOGY

## 2020-05-28 RX ORDER — BISACODYL 10 MG
10 SUPPOSITORY, RECTAL RECTAL DAILY PRN
Status: DISCONTINUED | OUTPATIENT
Start: 2020-05-29 | End: 2020-05-29 | Stop reason: HOSPADM

## 2020-05-28 RX ORDER — OXYCODONE HYDROCHLORIDE AND ACETAMINOPHEN 5; 325 MG/1; MG/1
1 TABLET ORAL EVERY 4 HOURS PRN
Status: DISCONTINUED | OUTPATIENT
Start: 2020-05-28 | End: 2020-05-29 | Stop reason: HOSPADM

## 2020-05-28 RX ORDER — MISOPROSTOL 200 UG/1
800 TABLET ORAL AS NEEDED
Status: DISCONTINUED | OUTPATIENT
Start: 2020-05-28 | End: 2020-05-28 | Stop reason: HOSPADM

## 2020-05-28 RX ORDER — CARBOPROST TROMETHAMINE 250 UG/ML
250 INJECTION, SOLUTION INTRAMUSCULAR AS NEEDED
Status: DISCONTINUED | OUTPATIENT
Start: 2020-05-28 | End: 2020-05-28 | Stop reason: HOSPADM

## 2020-05-28 RX ORDER — OXYTOCIN-SODIUM CHLORIDE 0.9% IV SOLN 30 UNIT/500ML 30-0.9/5 UT/ML-%
125 SOLUTION INTRAVENOUS CONTINUOUS PRN
Status: COMPLETED | OUTPATIENT
Start: 2020-05-28 | End: 2020-05-28

## 2020-05-28 RX ORDER — OXYTOCIN-SODIUM CHLORIDE 0.9% IV SOLN 30 UNIT/500ML 30-0.9/5 UT/ML-%
999 SOLUTION INTRAVENOUS ONCE
Status: COMPLETED | OUTPATIENT
Start: 2020-05-28 | End: 2020-05-28

## 2020-05-28 RX ORDER — METHYLERGONOVINE MALEATE 0.2 MG/ML
200 INJECTION INTRAVENOUS ONCE AS NEEDED
Status: DISCONTINUED | OUTPATIENT
Start: 2020-05-28 | End: 2020-05-28 | Stop reason: HOSPADM

## 2020-05-28 RX ORDER — ERYTHROMYCIN 5 MG/G
OINTMENT OPHTHALMIC
Status: DISPENSED
Start: 2020-05-28 | End: 2020-05-28

## 2020-05-28 RX ORDER — ONDANSETRON 4 MG/1
4 TABLET, FILM COATED ORAL EVERY 6 HOURS PRN
Status: DISCONTINUED | OUTPATIENT
Start: 2020-05-28 | End: 2020-05-29 | Stop reason: HOSPADM

## 2020-05-28 RX ORDER — ONDANSETRON 2 MG/ML
4 INJECTION INTRAMUSCULAR; INTRAVENOUS EVERY 6 HOURS PRN
Status: DISCONTINUED | OUTPATIENT
Start: 2020-05-28 | End: 2020-05-29 | Stop reason: HOSPADM

## 2020-05-28 RX ORDER — PROMETHAZINE HYDROCHLORIDE 25 MG/1
12.5 SUPPOSITORY RECTAL EVERY 6 HOURS PRN
Status: DISCONTINUED | OUTPATIENT
Start: 2020-05-28 | End: 2020-05-29 | Stop reason: HOSPADM

## 2020-05-28 RX ORDER — IBUPROFEN 600 MG/1
600 TABLET ORAL EVERY 8 HOURS PRN
Status: DISCONTINUED | OUTPATIENT
Start: 2020-05-28 | End: 2020-05-29 | Stop reason: HOSPADM

## 2020-05-28 RX ORDER — PROMETHAZINE HYDROCHLORIDE 25 MG/1
25 TABLET ORAL EVERY 6 HOURS PRN
Status: DISCONTINUED | OUTPATIENT
Start: 2020-05-28 | End: 2020-05-29 | Stop reason: HOSPADM

## 2020-05-28 RX ORDER — OXYTOCIN-SODIUM CHLORIDE 0.9% IV SOLN 30 UNIT/500ML 30-0.9/5 UT/ML-%
250 SOLUTION INTRAVENOUS CONTINUOUS
Status: DISPENSED | OUTPATIENT
Start: 2020-05-28 | End: 2020-05-28

## 2020-05-28 RX ORDER — DOCUSATE SODIUM 100 MG/1
100 CAPSULE, LIQUID FILLED ORAL 2 TIMES DAILY
Status: DISCONTINUED | OUTPATIENT
Start: 2020-05-28 | End: 2020-05-29 | Stop reason: HOSPADM

## 2020-05-28 RX ORDER — PHYTONADIONE 1 MG/.5ML
INJECTION, EMULSION INTRAMUSCULAR; INTRAVENOUS; SUBCUTANEOUS
Status: DISPENSED
Start: 2020-05-28 | End: 2020-05-28

## 2020-05-28 RX ORDER — LANOLIN
CREAM (ML) TOPICAL AS NEEDED
Status: DISCONTINUED | OUTPATIENT
Start: 2020-05-28 | End: 2020-05-29 | Stop reason: HOSPADM

## 2020-05-28 RX ORDER — HYDROCORTISONE 25 MG/G
1 CREAM TOPICAL AS NEEDED
Status: DISCONTINUED | OUTPATIENT
Start: 2020-05-28 | End: 2020-05-29 | Stop reason: HOSPADM

## 2020-05-28 RX ORDER — PROMETHAZINE HYDROCHLORIDE 25 MG/ML
12.5 INJECTION, SOLUTION INTRAMUSCULAR; INTRAVENOUS EVERY 6 HOURS PRN
Status: DISCONTINUED | OUTPATIENT
Start: 2020-05-28 | End: 2020-05-29 | Stop reason: HOSPADM

## 2020-05-28 RX ORDER — SODIUM CHLORIDE 0.9 % (FLUSH) 0.9 %
1-10 SYRINGE (ML) INJECTION AS NEEDED
Status: DISCONTINUED | OUTPATIENT
Start: 2020-05-28 | End: 2020-05-29 | Stop reason: HOSPADM

## 2020-05-28 RX ORDER — PRENATAL VIT NO.126/IRON/FOLIC 28MG-0.8MG
1 TABLET ORAL DAILY
Status: DISCONTINUED | OUTPATIENT
Start: 2020-05-28 | End: 2020-05-29 | Stop reason: HOSPADM

## 2020-05-28 RX ADMIN — OXYTOCIN 125 ML/HR: 10 INJECTION INTRAVENOUS at 03:55

## 2020-05-28 RX ADMIN — DOCUSATE SODIUM 100 MG: 100 CAPSULE, LIQUID FILLED ORAL at 22:15

## 2020-05-28 RX ADMIN — Medication 1 TABLET: at 08:15

## 2020-05-28 RX ADMIN — OXYTOCIN 250 ML/HR: 10 INJECTION INTRAVENOUS at 03:00

## 2020-05-28 RX ADMIN — IBUPROFEN 600 MG: 600 TABLET, FILM COATED ORAL at 05:39

## 2020-05-28 RX ADMIN — IBUPROFEN 600 MG: 600 TABLET, FILM COATED ORAL at 16:41

## 2020-05-28 RX ADMIN — OXYCODONE HYDROCHLORIDE AND ACETAMINOPHEN 1 TABLET: 5; 325 TABLET ORAL at 06:30

## 2020-05-28 RX ADMIN — OXYCODONE HYDROCHLORIDE AND ACETAMINOPHEN 1 TABLET: 5; 325 TABLET ORAL at 22:15

## 2020-05-28 RX ADMIN — Medication: at 22:49

## 2020-05-28 RX ADMIN — OXYCODONE HYDROCHLORIDE AND ACETAMINOPHEN 1 TABLET: 5; 325 TABLET ORAL at 12:28

## 2020-05-28 RX ADMIN — DOCUSATE SODIUM 100 MG: 100 CAPSULE, LIQUID FILLED ORAL at 08:16

## 2020-05-28 RX ADMIN — OXYTOCIN 999 ML/HR: 10 INJECTION INTRAVENOUS at 02:41

## 2020-05-28 RX ADMIN — Medication: at 06:30

## 2020-05-28 RX ADMIN — OXYCODONE HYDROCHLORIDE AND ACETAMINOPHEN 1 TABLET: 5; 325 TABLET ORAL at 18:35

## 2020-05-29 VITALS
SYSTOLIC BLOOD PRESSURE: 112 MMHG | BODY MASS INDEX: 37.95 KG/M2 | HEART RATE: 68 BPM | TEMPERATURE: 97.9 F | RESPIRATION RATE: 18 BRPM | WEIGHT: 201 LBS | DIASTOLIC BLOOD PRESSURE: 68 MMHG | HEIGHT: 61 IN

## 2020-05-29 LAB
BASOPHILS # BLD AUTO: 0.03 10*3/MM3 (ref 0–0.2)
BASOPHILS NFR BLD AUTO: 0.2 % (ref 0–1.5)
DEPRECATED RDW RBC AUTO: 45.5 FL (ref 37–54)
EOSINOPHIL # BLD AUTO: 0.23 10*3/MM3 (ref 0–0.4)
EOSINOPHIL NFR BLD AUTO: 1.7 % (ref 0.3–6.2)
ERYTHROCYTE [DISTWIDTH] IN BLOOD BY AUTOMATED COUNT: 13.5 % (ref 12.3–15.4)
HCT VFR BLD AUTO: 29.8 % (ref 34–46.6)
HGB BLD-MCNC: 10.1 G/DL (ref 12–15.9)
IMM GRANULOCYTES # BLD AUTO: 0.1 10*3/MM3 (ref 0–0.05)
IMM GRANULOCYTES NFR BLD AUTO: 0.7 % (ref 0–0.5)
LYMPHOCYTES # BLD AUTO: 2.41 10*3/MM3 (ref 0.7–3.1)
LYMPHOCYTES NFR BLD AUTO: 17.7 % (ref 19.6–45.3)
MCH RBC QN AUTO: 30.6 PG (ref 26.6–33)
MCHC RBC AUTO-ENTMCNC: 33.9 G/DL (ref 31.5–35.7)
MCV RBC AUTO: 90.3 FL (ref 79–97)
MONOCYTES # BLD AUTO: 0.7 10*3/MM3 (ref 0.1–0.9)
MONOCYTES NFR BLD AUTO: 5.2 % (ref 5–12)
NEUTROPHILS # BLD AUTO: 10.11 10*3/MM3 (ref 1.7–7)
NEUTROPHILS NFR BLD AUTO: 74.5 % (ref 42.7–76)
NRBC BLD AUTO-RTO: 0 /100 WBC (ref 0–0.2)
PLATELET # BLD AUTO: 198 10*3/MM3 (ref 140–450)
PMV BLD AUTO: 9.9 FL (ref 6–12)
RBC # BLD AUTO: 3.3 10*6/MM3 (ref 3.77–5.28)
WBC NRBC COR # BLD: 13.58 10*3/MM3 (ref 3.4–10.8)

## 2020-05-29 PROCEDURE — 85025 COMPLETE CBC W/AUTO DIFF WBC: CPT | Performed by: OBSTETRICS & GYNECOLOGY

## 2020-05-29 PROCEDURE — 99024 POSTOP FOLLOW-UP VISIT: CPT | Performed by: NURSE PRACTITIONER

## 2020-05-29 RX ORDER — IBUPROFEN 600 MG/1
600 TABLET ORAL EVERY 8 HOURS PRN
Qty: 45 TABLET | Refills: 1 | Status: SHIPPED | OUTPATIENT
Start: 2020-05-29 | End: 2021-02-22

## 2020-05-29 RX ORDER — OXYCODONE HYDROCHLORIDE AND ACETAMINOPHEN 5; 325 MG/1; MG/1
1-2 TABLET ORAL EVERY 4 HOURS PRN
Qty: 24 TABLET | Refills: 0 | Status: SHIPPED | OUTPATIENT
Start: 2020-05-29 | End: 2020-06-01

## 2020-05-29 RX ORDER — PSEUDOEPHEDRINE HCL 30 MG
100 TABLET ORAL 2 TIMES DAILY
Qty: 30 EACH | Refills: 2 | Status: SHIPPED | OUTPATIENT
Start: 2020-05-29 | End: 2021-02-22

## 2020-05-29 RX ADMIN — OXYCODONE HYDROCHLORIDE AND ACETAMINOPHEN 1 TABLET: 5; 325 TABLET ORAL at 09:38

## 2020-05-29 RX ADMIN — DOCUSATE SODIUM 100 MG: 100 CAPSULE, LIQUID FILLED ORAL at 09:38

## 2020-05-29 RX ADMIN — Medication 1 TABLET: at 09:38

## 2020-05-29 RX ADMIN — OXYCODONE HYDROCHLORIDE AND ACETAMINOPHEN 1 TABLET: 5; 325 TABLET ORAL at 13:29

## 2020-05-29 RX ADMIN — IBUPROFEN 600 MG: 600 TABLET, FILM COATED ORAL at 13:29

## 2020-05-29 RX ADMIN — IBUPROFEN 600 MG: 600 TABLET, FILM COATED ORAL at 04:57

## 2020-05-30 ENCOUNTER — TELEPHONE (OUTPATIENT)
Dept: OBSTETRICS AND GYNECOLOGY | Facility: HOSPITAL | Age: 40
End: 2020-05-30

## 2020-05-30 NOTE — TELEPHONE ENCOUNTER
Dr. Oliva called for LC and asked about a consult for this patient when baby comes in for bili test today.   LC contacted Ms. Alaniz and she declined a consult and said baby latches well. Her concern is that her breasts are very hard and they do not soften when he nurses. Reviewed initial engorgement and how to achieve comfort without over pumping and creating prolonged engorgement. Explained expected infant output and the impact of jaundice on infant feeding behaviors. Baby Boy is only two days old . Patient also has concerns appropriate for her OB and LC suggested she call her doctor for further instruction with that issue.

## 2020-05-31 LAB
BH BB BLOOD EXPIRATION DATE: NORMAL
BH BB BLOOD EXPIRATION DATE: NORMAL
BH BB BLOOD TYPE BARCODE: 5100
BH BB BLOOD TYPE BARCODE: 5100
BH BB DISPENSE STATUS: NORMAL
BH BB DISPENSE STATUS: NORMAL
BH BB PRODUCT CODE: NORMAL
BH BB PRODUCT CODE: NORMAL
BH BB UNIT NUMBER: NORMAL
BH BB UNIT NUMBER: NORMAL
CROSSMATCH INTERPRETATION: NORMAL
CROSSMATCH INTERPRETATION: NORMAL
UNIT  ABO: NORMAL
UNIT  ABO: NORMAL
UNIT  RH: NORMAL
UNIT  RH: NORMAL

## 2020-06-11 LAB — SPECIMEN STATUS: NORMAL

## 2020-08-11 ENCOUNTER — TELEPHONE (OUTPATIENT)
Dept: OBSTETRICS AND GYNECOLOGY | Facility: CLINIC | Age: 40
End: 2020-08-11

## 2020-08-24 ENCOUNTER — TELEPHONE (OUTPATIENT)
Dept: OBSTETRICS AND GYNECOLOGY | Facility: CLINIC | Age: 40
End: 2020-08-24

## 2020-08-31 ENCOUNTER — TELEPHONE (OUTPATIENT)
Dept: OBSTETRICS AND GYNECOLOGY | Facility: CLINIC | Age: 40
End: 2020-08-31

## 2021-02-22 ENCOUNTER — INITIAL PRENATAL (OUTPATIENT)
Dept: OBSTETRICS AND GYNECOLOGY | Facility: CLINIC | Age: 41
End: 2021-02-22

## 2021-02-22 VITALS — WEIGHT: 174 LBS | BODY MASS INDEX: 32.88 KG/M2 | DIASTOLIC BLOOD PRESSURE: 60 MMHG | SYSTOLIC BLOOD PRESSURE: 108 MMHG

## 2021-02-22 DIAGNOSIS — O09.892 SHORT INTERVAL BETWEEN PREGNANCIES AFFECTING PREGNANCY IN SECOND TRIMESTER, ANTEPARTUM: ICD-10-CM

## 2021-02-22 DIAGNOSIS — O09.522 AMA (ADVANCED MATERNAL AGE) MULTIGRAVIDA 35+, SECOND TRIMESTER: Primary | ICD-10-CM

## 2021-02-22 DIAGNOSIS — Z11.3 SCREEN FOR STD (SEXUALLY TRANSMITTED DISEASE): ICD-10-CM

## 2021-02-22 DIAGNOSIS — Z72.0 TOBACCO USE: ICD-10-CM

## 2021-02-22 DIAGNOSIS — Z34.92 PREGNANCY WITH UNCERTAIN DATES IN SECOND TRIMESTER: ICD-10-CM

## 2021-02-22 DIAGNOSIS — O09.32 LATE PRENATAL CARE AFFECTING PREGNANCY IN SECOND TRIMESTER: ICD-10-CM

## 2021-02-22 DIAGNOSIS — Z36.89 ENCOUNTER FOR FETAL ANATOMIC SURVEY: ICD-10-CM

## 2021-02-22 DIAGNOSIS — O34.219 PREVIOUS CESAREAN DELIVERY, ANTEPARTUM: ICD-10-CM

## 2021-02-22 LAB
GLUCOSE UR STRIP-MCNC: NEGATIVE MG/DL
PROT UR STRIP-MCNC: ABNORMAL MG/DL

## 2021-02-22 PROCEDURE — 99214 OFFICE O/P EST MOD 30 MIN: CPT | Performed by: OBSTETRICS & GYNECOLOGY

## 2021-02-22 RX ORDER — SWAB
1 SWAB, NON-MEDICATED MISCELLANEOUS DAILY
Qty: 90 EACH | Refills: 3 | Status: SHIPPED | OUTPATIENT
Start: 2021-02-22 | End: 2021-03-24

## 2021-02-22 NOTE — PROGRESS NOTES
Initial ob visit     CC- Here for care of pregnancy     Laisha Alaniz is being seen today for her first obstetrical visit.  She is a 40 y.o.    19w3d gestation.     # 1 - Date: 03, Sex: Female, Weight: 3742 g (8 lb 4 oz), GA: 40w0d, Delivery: Vaginal, Spontaneous, Apgar1: None, Apgar5: None, Living: Living, Birth Comments: None    # 2 - Date: 11/15/03, Sex: Female, Weight: 3487 g (7 lb 11 oz), GA: 40w0d, Delivery: Vaginal, Spontaneous, Apgar1: None, Apgar5: None, Living: Living, Birth Comments: None    # 3 - Date: 11/10/16, Sex: Female, Weight: 3145 g (6 lb 14.9 oz), GA: 40w2d, Delivery: , Low Transverse, Apgar1: 8, Apgar5: 9, Living: Living, Birth Comments: None    # 4 - Date: 20, Sex: Male, Weight: 3523 g (7 lb 12.3 oz), GA: 37w6d, Delivery: , Spontaneous, Apgar1: 8, Apgar5: 9, Living: Living, Birth Comments: scale 4    # 5 - Date: None, Sex: None, Weight: None, GA: None, Delivery: None, Apgar1: None, Apgar5: None, Living: None, Birth Comments: None      Uncertain about LNMP, was in denial since delivered in May, 2020   Feeling fetal movement   No other care   Last pregnancy ABS positive, no antibody identified   Then testing at blood bank + Anti-c (no titer)    delivery     Current obstetric complaints : heartburn, indigestion, and nausea   Duration/severity of complaints: 1 mth  Initial positive test date : mid January    Location : @ home    Prior obstetric issues, potential pregnancy concerns:  AMA,  with G4,  with G3  Family history of genetic issues (includes FOB): none  Prior infections concerning in pregnancy (Rash, fever in last 2 weeks): none  Varicella Hx -immunity  Prior testing for Cystic Fibrosis Carrier or Sickle Cell Trait- unknown status  Prepregnancy BMI - Body mass index is 32.88 kg/m².  Hx of HSV for patient or partner : No    Past Medical History:   Diagnosis Date   • Abnormal Pap smear of cervix    • Chlamydia    • Gonorrhea    • Migraine     • Migraines    • MRSA (methicillin resistant Staphylococcus aureus) 2015   • PID (pelvic inflammatory disease)    • Varicella        Past Surgical History:   Procedure Laterality Date   •  SECTION     • WISDOM TOOTH EXTRACTION         No current outpatient medications on file.    No Known Allergies    Social History     Socioeconomic History   • Marital status:      Spouse name: Not on file   • Number of children: Not on file   • Years of education: Not on file   • Highest education level: Not on file   Tobacco Use   • Smoking status: Current Every Day Smoker     Packs/day: 0.25     Types: Cigarettes   • Smokeless tobacco: Never Used   Substance and Sexual Activity   • Alcohol use: No   • Drug use: No   • Sexual activity: Yes     Partners: Male     Birth control/protection: None       Family History   Problem Relation Age of Onset   • Breast cancer Maternal Grandmother    • Ovarian cancer Neg Hx    • Uterine cancer Neg Hx    • Colon cancer Neg Hx    • Deep vein thrombosis Neg Hx    • Pulmonary embolism Neg Hx        Review of systems     Constitutional : Nausea, fatigue Nausea, mild - Fatigue none    : Vaginal bleeding, cramping no bleeding, no cramping   Breast Tenderness : yes   All other systems reviewed and negative        Objective    /60   Wt 78.9 kg (174 lb)   LMP 10/09/2020 (Exact Date)   BMI 32.88 kg/m²       General Appearance:    Alert, cooperative, in no acute distress, habitus Obese    Head:    Normocephalic, without obvious abnormality, atraumatic   Eyes:            Lids and lashes normal, conjunctivae and sclerae normal, no   icterus, no pallor, corneas clear   Ears:    Ears appear intact with no abnormalities noted       Neck:   No adenopathy, supple, trachea midline, no thyromegaly   Back:     No kyphosis present, no scoliosis present,                       Lungs:     Clear to auscultation,respirations regular, even and     unlabored    Heart:    Regular rhythm and  normal rate, normal S1 and S2, no            murmur, no gallop, no rub, no click   Breast Exam:    No masses, No nipple discharge   Abdomen:     Normal bowel sounds, no masses, no organomegaly, soft        non-tender, non-distended, no guarding, no rebound                 tenderness   Genitalia:    Vulva - BUS-WNL, NEFG    Vagina - No discharge, No bleeding    Cervix - No Lesions, closed     Uterus - Consistent with 18-20 weeks, +FHTs     Adnexa - No mass, NT    Pelvimetry - clinically adequate, gynecoid pelvis     Extremities:   Moves all extremities well, no edema, no cyanosis, no              redness   Pulses:   Pulses palpable and equal bilaterally   Skin:   No bleeding, bruising or rash   Lymph nodes:   No palpable adenopathy   Neurologic:   Sensation intact, A&O times 3      Assessment & Plan     Diagnoses and all orders for this visit:    1. AMA (advanced maternal age) multigravida 35+, second trimester (Primary)  -     OB Panel With HIV  -     Urine Culture - Urine, Urine, Clean Catch  -     [id number] PLUS Core+SCA - Blood,  -     Hemoglobin A1c    2. Previous  delivery, antepartum    3. Late prenatal care affecting pregnancy in second trimester  -     Drug Profile Urine - 9 Drugs - Urine, Clean Catch    4. Short interval between pregnancies affecting pregnancy in second trimester, antepartum    5. Screen for STD (sexually transmitted disease)  -     Chlamydia trachomatis, Neisseria gonorrhoeae, Trichomonas vaginalis, PCR - Swab, Vagina    6. Tobacco use    7. Encounter for fetal anatomic survey  -      Ob 14 + Weeks Single or First Gestation    8. Pregnancy with uncertain dates in second trimester  -     US Ob 14 + Weeks Single or First Gestation    Other orders  -     Prenatal 28-0.8 MG tablet; Take 1 tablet by mouth Daily. Please use formulary or generic, with DHA ideal  Dispense: 90 each; Refill: 3        1) Pregnancy at 19w3d  2) AMA   Check NIPT for down syndrome  3) Late onset to prenatal  care   Was in denial with recent delivery   Check UDS and stressed compliance   4) Prior  with one successful    Consideration of delivery later one   5) Short interval pregnancy Delivered in May, 2020   6) tobacco cessation encouraged   7) Last pregnancy at delivery with Anti-c   Recheck and titer today   8) uncertain dates as well.   So need ultrasound for dates and possibly anatomy        Activity recommendation : 150 minutes/week of moderate intensity aerobic activity unless we limit for bleeding, hypertension or other pregnancy complication   Patient is on Prenatal vitamins  Problem list reviewed and updated.  Reviewed routine prenatal care with the office to include but not limited to   Zika (travel restrictions/ok to use insect repellant), not to changing cat litter, food restrictions, avoidance of alcohol, tobacco and drugs and saunas/hot tubs.   All questions answered.     Garcia Demarco MD   2021  14:41 EST

## 2021-02-22 NOTE — PATIENT INSTRUCTIONS
Steps to Quit Smoking  Smoking tobacco is the leading cause of preventable death. It can affect almost every organ in the body. Smoking puts you and those around you at risk for developing many serious chronic diseases. Quitting smoking can be difficult, but it is one of the best things that you can do for your health. It is never too late to quit.  How do I get ready to quit?  When you decide to quit smoking, create a plan to help you succeed. Before you quit:  · Pick a date to quit. Set a date within the next 2 weeks to give you time to prepare.  · Write down the reasons why you are quitting. Keep this list in places where you will see it often.  · Tell your family, friends, and co-workers that you are quitting. Support from your loved ones can make quitting easier.  · Talk with your health care provider about your options for quitting smoking.  · Find out what treatment options are covered by your health insurance.  · Identify people, places, things, and activities that make you want to smoke (triggers). Avoid them.  What first steps can I take to quit smoking?  · Throw away all cigarettes at home, at work, and in your car.  · Throw away smoking accessories, such as ashtrays and lighters.  · Clean your car. Make sure to empty the ashtray.  · Clean your home, including curtains and carpets.  What strategies can I use to quit smoking?  Talk with your health care provider about combining strategies, such as taking medicines while you are also receiving in-person counseling. Using these two strategies together makes you more likely to succeed in quitting than if you used either strategy on its own.  · If you are pregnant or breastfeeding, talk with your health care provider about finding counseling or other support strategies to quit smoking. Do not take medicine to help you quit smoking unless your health care provider tells you to do so.  To quit smoking:  Quit right away  · Quit smoking completely, instead of  gradually reducing how much you smoke over a period of time. Research shows that stopping smoking right away is more successful than gradually quitting.  · Attend in-person counseling to help you build problem-solving skills. You are more likely to succeed in quitting if you attend counseling sessions regularly. Even short sessions of 10 minutes can be effective.  Take medicine  You may take medicines to help you quit smoking. Some medicines require a prescription and some you can purchase over-the-counter. Medicines may have nicotine in them to replace the nicotine in cigarettes. Medicines may:  · Help to stop cravings.  · Help to relieve withdrawal symptoms.  Your health care provider may recommend:  · Nicotine patches, gum, or lozenges.  · Nicotine inhalers or sprays.  · Non-nicotine medicine that is taken by mouth.  Find resources  Find resources and support systems that can help you to quit smoking and remain smoke-free after you quit. These resources are most helpful when you use them often. They include:  · Online chats with a counselor.  · Telephone quitlines.  · Printed self-help materials.  · Support groups or group counseling.  · Text messaging programs.  · Mobile phone apps or applications. Use apps that can help you stick to your quit plan by providing reminders, tips, and encouragement. There are many free apps for mobile devices as well as websites. Examples include Quit Guide from the CDC and smokefree.gov  What things can I do to make it easier to quit?    · Reach out to your family and friends for support and encouragement. Call telephone quitlines (8-944-QUIT-NOW), reach out to support groups, or work with a counselor for support.  · Ask people who smoke to avoid smoking around you.  · Avoid places that trigger you to smoke, such as bars, parties, or smoke-break areas at work.  · Spend time with people who do not smoke.  · Lessen the stress in your life. Stress can be a smoking trigger for some  people. To lessen stress, try:  ? Exercising regularly.  ? Doing deep-breathing exercises.  ? Doing yoga.  ? Meditating.  ? Performing a body scan. This involves closing your eyes, scanning your body from head to toe, and noticing which parts of your body are particularly tense. Try to relax the muscles in those areas.  How will I feel when I quit smoking?  Day 1 to 3 weeks  Within the first 24 hours of quitting smoking, you may start to feel withdrawal symptoms. These symptoms are usually most noticeable 2-3 days after quitting, but they usually do not last for more than 2-3 weeks. You may experience these symptoms:  · Mood swings.  · Restlessness, anxiety, or irritability.  · Trouble concentrating.  · Dizziness.  · Strong cravings for sugary foods and nicotine.  · Mild weight gain.  · Constipation.  · Nausea.  · Coughing or a sore throat.  · Changes in how the medicines that you take for unrelated issues work in your body.  · Depression.  · Trouble sleeping (insomnia).  Week 3 and afterward  After the first 2-3 weeks of quitting, you may start to notice more positive results, such as:  · Improved sense of smell and taste.  · Decreased coughing and sore throat.  · Slower heart rate.  · Lower blood pressure.  · Clearer skin.  · The ability to breathe more easily.  · Fewer sick days.  Quitting smoking can be very challenging. Do not get discouraged if you are not successful the first time. Some people need to make many attempts to quit before they achieve long-term success. Do your best to stick to your quit plan, and talk with your health care provider if you have any questions or concerns.  Summary  · Smoking tobacco is the leading cause of preventable death. Quitting smoking is one of the best things that you can do for your health.  · When you decide to quit smoking, create a plan to help you succeed.  · Quit smoking right away, not slowly over a period of time.  · When you start quitting, seek help from your  health care provider, family, or friends.  This information is not intended to replace advice given to you by your health care provider. Make sure you discuss any questions you have with your health care provider.  Document Revised: 09/11/2020 Document Reviewed: 03/07/2020  Elsevier Patient Education © 2020 [employer]

## 2021-02-23 LAB
ABO GROUP BLD: ABNORMAL
BASOPHILS # BLD AUTO: 0 X10E3/UL (ref 0–0.2)
BASOPHILS NFR BLD AUTO: 0 %
BLD GP AB SCN SERPL QL: ABNORMAL
BLD GP AB SCN SERPL QL: POSITIVE
BLOOD GROUP ANTIBODIES SERPL: ABNORMAL
EOSINOPHIL # BLD AUTO: 0.1 X10E3/UL (ref 0–0.4)
EOSINOPHIL NFR BLD AUTO: 1 %
ERYTHROCYTE [DISTWIDTH] IN BLOOD BY AUTOMATED COUNT: 13.1 % (ref 11.7–15.4)
HBA1C MFR BLD: 4.9 % (ref 4.8–5.6)
HBV SURFACE AG SERPL QL IA: NEGATIVE
HCT VFR BLD AUTO: 31.5 % (ref 34–46.6)
HCV AB S/CO SERPL IA: <0.1 S/CO RATIO (ref 0–0.9)
HGB BLD-MCNC: 11 G/DL (ref 11.1–15.9)
HIV 1+2 AB+HIV1 P24 AG SERPL QL IA: NON REACTIVE
IMM GRANULOCYTES # BLD AUTO: 0.1 X10E3/UL (ref 0–0.1)
IMM GRANULOCYTES NFR BLD AUTO: 1 %
LYMPHOCYTES # BLD AUTO: 1.8 X10E3/UL (ref 0.7–3.1)
LYMPHOCYTES NFR BLD AUTO: 15 %
MCH RBC QN AUTO: 31.3 PG (ref 26.6–33)
MCHC RBC AUTO-ENTMCNC: 34.9 G/DL (ref 31.5–35.7)
MCV RBC AUTO: 90 FL (ref 79–97)
MONOCYTES # BLD AUTO: 0.4 X10E3/UL (ref 0.1–0.9)
MONOCYTES NFR BLD AUTO: 4 %
NEUTROPHILS # BLD AUTO: 9.5 X10E3/UL (ref 1.4–7)
NEUTROPHILS NFR BLD AUTO: 79 %
PLATELET # BLD AUTO: 276 X10E3/UL (ref 150–450)
RBC # BLD AUTO: 3.51 X10E6/UL (ref 3.77–5.28)
RH BLD: POSITIVE
RPR SER QL: NON REACTIVE
RUBV IGG SERPL IA-ACNC: 4.57 INDEX
WBC # BLD AUTO: 11.9 X10E3/UL (ref 3.4–10.8)
XXX BLOOD GROUP AB TITR SERPL AHG: ABNORMAL {TITER}

## 2021-02-23 RX ORDER — FERROUS SULFATE 325(65) MG
325 TABLET ORAL
Qty: 30 TABLET | Refills: 6 | Status: SHIPPED | OUTPATIENT
Start: 2021-02-23 | End: 2021-03-24

## 2021-02-24 LAB
BACTERIA UR CULT: NO GROWTH
BACTERIA UR CULT: NORMAL
C TRACH RRNA SPEC QL NAA+PROBE: NEGATIVE
N GONORRHOEA RRNA SPEC QL NAA+PROBE: NEGATIVE
T VAGINALIS DNA SPEC QL NAA+PROBE: NEGATIVE

## 2021-02-25 LAB
AMPHETAMINES UR QL SCN: NEGATIVE NG/ML
BARBITURATES UR QL SCN: NEGATIVE NG/ML
BENZODIAZ UR QL: NEGATIVE NG/ML
BZE UR QL: NEGATIVE NG/ML
CANNABINOIDS UR CFM-MCNC: POSITIVE NG/ML
METHADONE UR QL SCN: NEGATIVE NG/ML
OPIATES UR QL: NEGATIVE NG/ML
PCP UR QL: NEGATIVE NG/ML
PROPOXYPH UR QL SCN: NEGATIVE NG/ML

## 2021-02-28 LAB
CFDNA.FET/CFDNA.TOTAL SFR FETUS: NORMAL %
CITATION REF LAB TEST: NORMAL
FET 13+18+21+X+Y ANEUP PLAS.CFDNA: NEGATIVE
FET CHR 21 TS PLAS.CFDNA QL: NEGATIVE
FET MS X RISK WBC.DNA+CFDNA QL: NOT DETECTED
FET SEX PLAS.CFDNA DOSAGE CFDNA: NORMAL
FET TS 13 RISK PLAS.CFDNA QL: NEGATIVE
FET TS 18 RISK WBC.DNA+CFDNA QL: NEGATIVE
FET X + Y ANEUP RISK PLAS.CFDNA SEQ-IMP: NOT DETECTED
GA EST FROM CONCEPTION DATE: NORMAL D
GESTATIONAL AGE > 9:: YES
LAB DIRECTOR NAME PROVIDER: NORMAL
LAB DIRECTOR NAME PROVIDER: NORMAL
LABORATORY COMMENT REPORT: NORMAL
LIMITATIONS OF THE TEST: NORMAL
NEGATIVE PREDICTIVE VALUE: NORMAL
NOTE: NORMAL
PERFORMANCE CHARACTERISTICS: NORMAL
POSITIVE PREDICTIVE VALUE: NORMAL
REF LAB TEST METHOD: NORMAL
TEST PERFORMANCE INFO SPEC: NORMAL

## 2021-03-01 ENCOUNTER — TELEPHONE (OUTPATIENT)
Dept: OBSTETRICS AND GYNECOLOGY | Facility: CLINIC | Age: 41
End: 2021-03-01

## 2021-03-01 NOTE — TELEPHONE ENCOUNTER
----- Message from Garcia Demarco MD sent at 3/1/2021  9:58 AM EST -----  Kaylynn, let her know the down syndrome  Test was negative/Normal. Infant appears female. Thanks Dr. Demarco

## 2021-03-24 ENCOUNTER — ROUTINE PRENATAL (OUTPATIENT)
Dept: OBSTETRICS AND GYNECOLOGY | Facility: CLINIC | Age: 41
End: 2021-03-24

## 2021-03-24 ENCOUNTER — TELEPHONE (OUTPATIENT)
Dept: OBSTETRICS AND GYNECOLOGY | Facility: CLINIC | Age: 41
End: 2021-03-24

## 2021-03-24 VITALS — BODY MASS INDEX: 33.82 KG/M2 | SYSTOLIC BLOOD PRESSURE: 126 MMHG | DIASTOLIC BLOOD PRESSURE: 55 MMHG | WEIGHT: 179 LBS

## 2021-03-24 DIAGNOSIS — O34.219 PREVIOUS CESAREAN DELIVERY, ANTEPARTUM: ICD-10-CM

## 2021-03-24 DIAGNOSIS — O36.1920 MATERNAL ATYPICAL ANTIBODY AFFECTING PREGNANCY IN SECOND TRIMESTER, SINGLE OR UNSPECIFIED FETUS: ICD-10-CM

## 2021-03-24 DIAGNOSIS — O09.892 SHORT INTERVAL BETWEEN PREGNANCIES AFFECTING PREGNANCY IN SECOND TRIMESTER, ANTEPARTUM: ICD-10-CM

## 2021-03-24 DIAGNOSIS — O09.522 AMA (ADVANCED MATERNAL AGE) MULTIGRAVIDA 35+, SECOND TRIMESTER: Primary | ICD-10-CM

## 2021-03-24 DIAGNOSIS — Z36.9 ANTENATAL SCREENING ENCOUNTER: ICD-10-CM

## 2021-03-24 DIAGNOSIS — O09.32 LATE PRENATAL CARE AFFECTING PREGNANCY IN SECOND TRIMESTER: ICD-10-CM

## 2021-03-24 LAB
GLUCOSE UR STRIP-MCNC: NEGATIVE MG/DL
PROT UR STRIP-MCNC: ABNORMAL MG/DL

## 2021-03-24 PROCEDURE — 99214 OFFICE O/P EST MOD 30 MIN: CPT | Performed by: OBSTETRICS & GYNECOLOGY

## 2021-03-24 RX ORDER — LANOLIN ALCOHOL/MO/W.PET/CERES
1 CREAM (GRAM) TOPICAL
COMMUNITY
Start: 2021-02-23 | End: 2021-08-02

## 2021-03-24 RX ORDER — PRENATAL VIT/IRON FUM/FOLIC AC 27MG-0.8MG
1 TABLET ORAL DAILY
COMMUNITY
Start: 2021-02-22 | End: 2022-01-04

## 2021-03-24 NOTE — TELEPHONE ENCOUNTER
----- Message from Garcia Demarco MD sent at 2/23/2021  4:59 PM EST -----  Kaylynn, We discussed above. 1) Anemia - mild in mid pregnancy. Went ahead and sent in iron to treat with prenatal vitamin - needs to take 4 hours separate from vitamin. 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  She was going to have him come by and get tested.  So the test I could not find in Epic but per internet from [employer] it is called Red blood cell antigen type C/c - Test 909910 CPT 86905 x 2. The Dx codes to use to order test are Z01.83 (blood typing) and Z31.9 ([employer] [facility]). Can you help them get this done. Thanks, Dr. Demarco

## 2021-03-24 NOTE — PROGRESS NOTES
OB follow up     Laisha Alaniz is a 40 y.o.  25w2d being seen today for her obstetrical visit.  Patient reports no complaints. Fetal movement: normal.    Her prenatal care is complicated by (and status): AMA, prior  (sucessful ), Late prenatal care/short pregnancy interval and Anti-c antibody     Review of Systems  Cramping/contractions : none   Vaginal bleeding: none   Fetal movement good     /55   Wt 81.2 kg (179 lb)   LMP 10/09/2020 (Approximate)   BMI 33.82 kg/m²     FHT: 144 BPM   Uterine Size: 23 cm       Assessment    Diagnoses and all orders for this visit:    1. AMA (advanced maternal age) multigravida 35+, second trimester (Primary)    2. Previous  delivery, antepartum    3. Late prenatal care affecting pregnancy in second trimester  -     US Ob Follow Up Transabdominal Approach    4. Short interval between pregnancies affecting pregnancy in second trimester, antepartum    5. 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  screening encounter  -     Gestational Screen 1 Hr ([facility])        1) pregnancy at 25w2d   A positive, GTT ordered-next visit.   Labs, cultures and ultrasound reviewed.   2) AMA - NIPT normal   3) Late prenatal care,   Short interval pregnancy   Did UDS - only THC noted (stressed avoidance)   Repeat scan next visit to ensure accurate dating   4) Anti-c   Too weak to titer  Checking FOB to see if he has antigen   Repeat titer with blood work in 2 weeks   Follow up from there.   5) Prior    Considering  again       Plan    Reviewed this stage of pregnancy  Problem list updated   Follow up in 2 weeks    Garcia Demarco MD   3/24/2021  15:46 EDT

## 2021-03-25 ENCOUNTER — TELEPHONE (OUTPATIENT)
Dept: OBSTETRICS AND GYNECOLOGY | Facility: CLINIC | Age: 41
End: 2021-03-25

## 2021-03-25 NOTE — TELEPHONE ENCOUNTER
----- Message from Garcia Demarco MD sent at 3/24/2021  6:15 PM EDT -----  All reviewed again during her visit in office today. Thanks, Dr. Demarco

## 2021-04-15 ENCOUNTER — ROUTINE PRENATAL (OUTPATIENT)
Dept: OBSTETRICS AND GYNECOLOGY | Facility: CLINIC | Age: 41
End: 2021-04-15

## 2021-04-15 VITALS — DIASTOLIC BLOOD PRESSURE: 52 MMHG | WEIGHT: 183 LBS | SYSTOLIC BLOOD PRESSURE: 113 MMHG | BODY MASS INDEX: 34.58 KG/M2

## 2021-04-15 DIAGNOSIS — O09.33 LATE PRENATAL CARE AFFECTING PREGNANCY IN THIRD TRIMESTER: ICD-10-CM

## 2021-04-15 DIAGNOSIS — O34.219 PREVIOUS CESAREAN DELIVERY, ANTEPARTUM: ICD-10-CM

## 2021-04-15 DIAGNOSIS — O09.893 SHORT INTERVAL BETWEEN PREGNANCIES AFFECTING PREGNANCY IN THIRD TRIMESTER, ANTEPARTUM: ICD-10-CM

## 2021-04-15 DIAGNOSIS — O09.523 AMA (ADVANCED MATERNAL AGE) MULTIGRAVIDA 35+, THIRD TRIMESTER: Primary | ICD-10-CM

## 2021-04-15 DIAGNOSIS — O36.1930 MATERNAL ATYPICAL ANTIBODY AFFECTING PREGNANCY IN THIRD TRIMESTER, SINGLE OR UNSPECIFIED FETUS: ICD-10-CM

## 2021-04-15 LAB
GLUCOSE UR STRIP-MCNC: NEGATIVE MG/DL
PROT UR STRIP-MCNC: ABNORMAL MG/DL

## 2021-04-15 PROCEDURE — 99214 OFFICE O/P EST MOD 30 MIN: CPT | Performed by: OBSTETRICS & GYNECOLOGY

## 2021-04-15 NOTE — PROGRESS NOTES
OB follow up     Laisha Alaniz is a 40 y.o.  28w3d being seen today for her obstetrical visit.  Patient reports no complaints. Fetal movement: normal.    Her prenatal care is complicated by (and status): AMA, Prior  (with prior ), Late prenatal care/short interval pregnancy and Anti-c Ab     Review of Systems  Cramping/contractions : none   Vaginal bleeding: none   Fetal movement good     /52   Wt 83 kg (183 lb)   LMP 10/09/2020 (Approximate)   BMI 34.58 kg/m²     FHT: 154 BPM   Uterine Size: 27 cm       Assessment    Diagnoses and all orders for this visit:    1. AMA (advanced maternal age) multigravida 35+, third trimester (Primary)    2. Previous  delivery, antepartum    3. Late prenatal care affecting pregnancy in third trimester    4. Short interval between pregnancies affecting pregnancy in third trimester, antepartum    5. Maternal atypical antibody affecting pregnancy in third trimester, single or unspecified fetus  -     [facility]        1) pregnancy at 28w3d   Rh+, to do GTT/CBC  To do tomorrow   Peds, prenatal classes and tours  TDaP, flu and COVID vaccinations discussed   Encouraged questions about L&D, anesthesia, breast feeding and birth control  2) AMA - normal NIPT  3) Late prenatal care, short interval pregnancy   Wanted to do follow up scan for interval growth   Not scheduled as requested, will do next visit   4) Anti-c  He did antigen testing - Just returned and is positive   Recheck with blood work tomorrow.   Consult with MFM to see if they think we should do MCA, etc.   5) Prior  with successful .   Will reassess closer to term   Encouraged to consider PPS     Plan    Reviewed this stage of pregnancy  Problem list updated   Follow up in 2 weeks    Garcia Demarco MD   4/15/2021  14:50 EDT

## 2021-04-16 ENCOUNTER — BULK ORDERING (OUTPATIENT)
Dept: CASE MANAGEMENT | Facility: OTHER | Age: 41
End: 2021-04-16

## 2021-04-16 DIAGNOSIS — Z23 IMMUNIZATION DUE: ICD-10-CM

## 2021-04-26 ENCOUNTER — OFFICE VISIT (OUTPATIENT)
Dept: OBSTETRICS AND GYNECOLOGY | Facility: CLINIC | Age: 41
End: 2021-04-26

## 2021-04-26 ENCOUNTER — HOSPITAL ENCOUNTER (OUTPATIENT)
Dept: ULTRASOUND IMAGING | Facility: HOSPITAL | Age: 41
Discharge: HOME | End: 2021-04-26
Admitting: OBSTETRICS & GYNECOLOGY

## 2021-04-26 ENCOUNTER — TRANSCRIBE ORDERS (OUTPATIENT)
Dept: ULTRASOUND IMAGING | Facility: HOSPITAL | Age: 41
End: 2021-04-26

## 2021-04-26 VITALS
SYSTOLIC BLOOD PRESSURE: 120 MMHG | HEIGHT: 60 IN | HEART RATE: 88 BPM | WEIGHT: 183 LBS | DIASTOLIC BLOOD PRESSURE: 64 MMHG | TEMPERATURE: 97.3 F | BODY MASS INDEX: 35.93 KG/M2

## 2021-04-26 DIAGNOSIS — O09.523 AMA (ADVANCED MATERNAL AGE) MULTIGRAVIDA 35+, THIRD TRIMESTER: ICD-10-CM

## 2021-04-26 DIAGNOSIS — O36.1190 ISOIMMUNIZATION FROM BLOOD GROUP INCOMPATIBILITY DURING PREGNANCY, ANTEPARTUM, SINGLE OR UNSPECIFIED FETUS: Primary | ICD-10-CM

## 2021-04-26 DIAGNOSIS — Z36.89 SCREENING, ANTENATAL, FOR FETAL ANATOMIC SURVEY: ICD-10-CM

## 2021-04-26 DIAGNOSIS — Z36.89 ENCOUNTER FOR ULTRASOUND TO ASSESS FETAL GROWTH: ICD-10-CM

## 2021-04-26 DIAGNOSIS — O99.330 TOBACCO USE DURING PREGNANCY, ANTEPARTUM: ICD-10-CM

## 2021-04-26 DIAGNOSIS — Z91.89 AT RISK FOR FETAL COMPROMISE: ICD-10-CM

## 2021-04-26 DIAGNOSIS — O34.219 PREVIOUS CESAREAN DELIVERY, ANTEPARTUM: ICD-10-CM

## 2021-04-26 DIAGNOSIS — O09.892 SHORT INTERVAL BETWEEN PREGNANCIES AFFECTING PREGNANCY IN SECOND TRIMESTER, ANTEPARTUM: ICD-10-CM

## 2021-04-26 DIAGNOSIS — Z92.89 HISTORY OF FETAL BIOPHYSICAL PROFILE: ICD-10-CM

## 2021-04-26 DIAGNOSIS — O99.019 MATERNAL ANEMIA IN PREGNANCY, ANTEPARTUM: ICD-10-CM

## 2021-04-26 DIAGNOSIS — O09.523 AMA (ADVANCED MATERNAL AGE) MULTIGRAVIDA 35+, THIRD TRIMESTER: Primary | ICD-10-CM

## 2021-04-26 PROCEDURE — 76819 FETAL BIOPHYS PROFIL W/O NST: CPT

## 2021-04-26 PROCEDURE — 76819 FETAL BIOPHYS PROFIL W/O NST: CPT | Performed by: OBSTETRICS & GYNECOLOGY

## 2021-04-26 PROCEDURE — 76821 MIDDLE CEREBRAL ARTERY ECHO: CPT

## 2021-04-26 PROCEDURE — 76811 OB US DETAILED SNGL FETUS: CPT

## 2021-04-26 PROCEDURE — 76821 MIDDLE CEREBRAL ARTERY ECHO: CPT | Performed by: OBSTETRICS & GYNECOLOGY

## 2021-04-26 PROCEDURE — 99242 OFF/OP CONSLTJ NEW/EST SF 20: CPT | Performed by: OBSTETRICS & GYNECOLOGY

## 2021-04-26 PROCEDURE — 76811 OB US DETAILED SNGL FETUS: CPT | Performed by: OBSTETRICS & GYNECOLOGY

## 2021-04-26 NOTE — PROGRESS NOTES
MATERNAL FETAL MEDICINE OUTPATIENT NOTE     Dear Dr Demarco    This is a  new visit.     Thank you for requesting my service to provide consultation for Laisha Alaniz is a 40 y.o.   at  30 0/7 weeks gestation (Estimated Date of Delivery: 21).   She is being seen for:ocdinoeling regarding isoimmunization    Prior notes from the patient's obstetrician were reviewed.     Today, she denies headache, blurry vision, RUQ pain. No vaginal bleeding, no contractions.     Chief complaint    ICD-10-CM ICD-9-CM   1. Isoimmunization from blood group incompatibility during pregnancy, antepartum, single or unspecified fetus  O36.1190 656.23   2. At risk for fetal compromise  Z91.89 V15.89   3. AMA (advanced maternal age) multigravida 35+, third trimester  O09.523 659.63   4. Previous  delivery, antepartum  O34.219 654.23   5. Short interval between pregnancies affecting pregnancy in second trimester, antepartum  O09.892 V23.89   6. Tobacco use during pregnancy, antepartum  O99.330 649.03   7. Maternal anemia in pregnancy, antepartum  O99.019 648.23     285.9   8. Encounter for ultrasound to assess fetal growth  Z36.89 V28.3   9. Screening, , for fetal anatomic survey  Z36.89 V28.81   10. History of fetal biophysical profile  Z92.89 V15.89       Past obstetric, gynecological, medical, surgical, family and social history reviewed; no changes made.     Review of systems  Constitutional:  No Weight Change, No Fever, No Chills, No Fatigue, No Malaise  ENT/Mouth:  No Hearing Changes, No Sinus Pain, No Hoarseness, No sore throat, No   Eyes:  No Eye Pain, No Vision Changes  Cardiovascular:  No Chest Pain, No SOB, No Palpitations  Respiratory:  No Cough, No Wheezing, No Dyspnea  Gastrointestinal:  No Nausea, No Vomiting, No Diarrhea, No Constipation, No Pain   Genitourinary:   No Dysuria, No Urinary Frequency, No Hematuria, No Flank Pain  Musculoskeletal:  No Arthralgias, No Myalgias,   Skin:  No Skin Lesions, No  "Pruritis,   Neuro:  No Weakness, No Numbness, No Loss of Consciousness, No Syncope  Psych:  No Memory Changes, No Violence/Abuse Hx., No Eating Concerns  Heme/Lymph:  No Bruising, No Bleeding  Endocrine:   No Temperature Intolerance    Vitals:    21 0921   BP: 120/64   BP Location: Right arm   Patient Position: Sitting   Cuff Size: Large Adult   Pulse: 88   Temp: 97.3 °F (36.3 °C)   TempSrc: Infrared   Weight: 83 kg (183 lb)   Height: 152.4 cm (60\")       PHYSICAL EXAM   GENERAL: Not in acute distress, gravid   NEURO: awake, alert and oriented to person, place, and time  ABDOMINAL: No fundal tenderness, no rebound or guarding   EXTREMITIES: Bilaterally lower extremities No edema, no tenderness  PELVIC: No obvious vaginal discharge, no bleeding    ULTRASOUND   Please view full ultrasound note on Imaging tab in [employer].      ASSESSMENT   40 y.o.   at  30 0/7 weeks gestation (Estimated Date of Delivery: 21), reassuring fetal and maternal status.     1. Single live intrauterine pregnancy   [ X ] stable  [   ] improving [  ] worsening    2. 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 The presence of an irregular antibody does not usually cause problems in the index pregnancy (with the exception of Shasha). She believes her antibody testing became positive about two pregnancies ago.  Therefore with each subsequent pregnancy, in presence of isoimmunization of antibodies - risk of fetal anemia, fetal death, and  hemolytic anemia exists. Patient and I reviewed the most problematic antibodies (that cross the placenta) and are capable of destroying fetal red blood cells leading to - fetal anemia, fetal death, and  " hemolytic anemia (hemolytic disease of the  HDN).     We reviewed her positive anti-c antibody screen. Therefore she does not posses the c-antigen on her blood cells.   Her Partner has been tested and he does possess the c-antigen.  Therefore there is a risk that the fetus may also have the c-antigen and therefore is at risk of fetal anemia and HDN  Presence of antibodies should prompt evaluation of  the paternal blood group and Rh genotype.     We discussed management plan  - maternal antibodies should be assessed every 3 - 4 weeks by primary OB.   If the titers are at or over 1:16, there is increased risk of fetal anemia - and weekly Middle cerebral artery Doppler studies should be done until delivery.   If MCA Doppler PSV studies are > 1.5 MoM, prior to 32 weeks gestation, patient will be referred to a high risk center ([facility], [facility], or [facility]) for percutaneous umbilical blood sampling (intrauterine fetal blood transfusion).   f MCA Doppler PSV studies are < 1.5 MoM, continue weekly surveillance.     -If titers remain low or if titers > 1:16 with MCA PSV < 1.5MoM, Per ACOG delivery at 37 0/7 - 38 6/7 weeks gestation, sooner if clinically indicated.      3. Advanced Maternal Age  [ X ] stable  [   ] improving [  ] worsening    NIPT Low risk     The patient's age related risk for aneuploidy was reviewed. Noninvasive prenatal screening with cell-free fetal DNA (NIPT) results reviewed.  Differences between genetic screening with NIPT and invasive diagnostic testing with amniocentesis in the second trimester were reviewed. Limitations NIPT were reviewed as well as differences in detection rate and false-positive rate. The risk of fetal loss associated with invasive testing with amniocentesis was reviewed.     Maternal age greater than 40 has been associated with placental insufficiency with increased risk of fetal growth disorder, stillbirth, hypertensive disorders.      4. Anemia in pregnancy  [ X ] stable  [   ] improving [  ] worsening    Anemia in pregnancy possible etiologies include but not limited to: iron deficiency anemia, concomitant micro and macro-cystic anemia, or thalassemia disorders. Recommend iron supplementation twice daily with plenty hydration and daily stool softener if needed.      5. Obesity in pregnancy - Diabetes screen pending   [ X ] stable  [   ] improving [  ] worsening    Obesity increases the risk of hypertensive disorders, diabetes and operative delivery. We discussed recommended weight gain of 11-20 lb during pregnancy and discussed the importance of diet modifications and exercise. Technical limitations (eg, maternal obesity, prone fetal position, and late gestation) can make a detailed heart evaluation very difficult due to acoustic shadowing. Therefore, fetal anatomy is suboptimal and will likely remain suboptimal for the remainder of the pregnancy    Per ACOG: Women in pregnancy should aim for 30 minutes of moderate-intensity aerobic exercise at least 5 days a week or a minimum of 150 minutes per week      6. Prior  delivery     7. Tobacco use in pregnancy  [ X ] stable  [   ] improving [  ] worsening     risk and risk of exacerbating coronary heart disease because of continued tobacco dependence exist. Recommend continued smoking cessation counseling with avoidance of trans-dermal cessation or oral cessation medications during pregnancy. Risks of tobacco abuse in pregnancy include IUGR, abruption and PTL as well as for the child postpartum including increased risk of asthma and SIDS.                       MANAGEMENT PLAN  -Followup diabetes screening   -Followup with MFM in 3 weeks   -maternal antibodies should be assessed every 3 - 4 weeks by primary OB.   -Serial growth ultrasounds every 3 - 6 weeks (by primary OB)   -Starting at 28 - 32 weeks: Weekly fetal  surveillance until delivery   -Starting at 28 weeks:  Fetal movement instructions given continue daily until delivery; instructed to report to labor and delivery if cannot achieve more than 10 kicks in one hour or if she perceives a decrease in fetal movement  - Recommend to be done by primary OB: anemia workup -  - repeat CBC and if persistently low then - Iron, Ferritin, TIBC, folate, vitamin B12, and hemoglobin electrophoresis   - Consider IV iron transfusion in third trimester in anticipation for delivery       DELIVERY PLANNING   If titers remain low or if titers > 1:16 with MCA PSV < 1.5MoM, Per ACOG delivery at 37 0/7 - 38 6/7 weeks gestation, sooner if clinically indicated.        Discussed with Dr Demarco.     This note has been routed to the referring obstetricians/medical provider.   At the end of the consultation, all patient questions were answered, concerns addressed, and comprehensive management plan and followup given to patient.     Thank you for your clinical consult.     LUIS AMOR MD MPH FACOG  MATERNAL FETAL MEDICINE

## 2021-05-10 ENCOUNTER — ROUTINE PRENATAL (OUTPATIENT)
Dept: OBSTETRICS AND GYNECOLOGY | Facility: CLINIC | Age: 41
End: 2021-05-10

## 2021-05-10 VITALS — WEIGHT: 183 LBS | SYSTOLIC BLOOD PRESSURE: 118 MMHG | DIASTOLIC BLOOD PRESSURE: 70 MMHG | BODY MASS INDEX: 35.74 KG/M2

## 2021-05-10 DIAGNOSIS — O09.523 MULTIGRAVIDA OF ADVANCED MATERNAL AGE IN THIRD TRIMESTER: ICD-10-CM

## 2021-05-10 DIAGNOSIS — O09.30 LATE PRENATAL CARE: ICD-10-CM

## 2021-05-10 DIAGNOSIS — O36.1930 MATERNAL ATYPICAL ANTIBODY AFFECTING PREGNANCY IN THIRD TRIMESTER, SINGLE OR UNSPECIFIED FETUS: ICD-10-CM

## 2021-05-10 DIAGNOSIS — Z3A.32 32 WEEKS GESTATION OF PREGNANCY: Primary | ICD-10-CM

## 2021-05-10 DIAGNOSIS — Z98.891 PREVIOUS CESAREAN SECTION: ICD-10-CM

## 2021-05-10 DIAGNOSIS — O09.893 SHORT INTERVAL BETWEEN PREGNANCIES COMPLICATING PREGNANCY, ANTEPARTUM, THIRD TRIMESTER: ICD-10-CM

## 2021-05-10 LAB
GLUCOSE UR STRIP-MCNC: NEGATIVE MG/DL
PROT UR STRIP-MCNC: NEGATIVE MG/DL

## 2021-05-10 PROCEDURE — 99214 OFFICE O/P EST MOD 30 MIN: CPT | Performed by: NURSE PRACTITIONER

## 2021-05-10 NOTE — PROGRESS NOTES
OB follow up     Laisha Alaniz is a 41 y.o.  32w0d being seen today for her obstetrical visit.  Patient reports no complaints. Fetal movement: normal.    Review of Systems  Cramping/contractions:denies  Vaginal bleeding: denies  Fetal movement: normal    /70   Wt 83 kg (183 lb)   LMP 10/09/2020 (Approximate)   BMI 35.74 kg/m²     FHT: 144 BPM   Uterine Size: size equals dates       Assessment/Plan    1) Pregnancy at 32w0d   2) Previous  section-desires , prior successful  2020  3) Anti c antibody  4) Short interval between pregnancies  5) AMA  6) Maternal atypical antibody    Ultrasound completed today, EFW 4 lb 12 oz, 65th percentile, LI WNL   Followed by MFM, has next appt 21  Will collected Anti c titer 21  Reviewed fetal kick counts  Reviewed this stage of pregnancy  Problem list updated   We reviewed LARC options and she was provided with pamphlets. She is not interested in tubal ligation at this time    Follow up in 2 weeks    I have spent 20 min in face to face time with the patient and 20 min of this time was spent in counseling on the above stated issues.    Idalmis Navarro, DAMON  5/10/2021  11:28 EDT

## 2021-05-17 ENCOUNTER — HOSPITAL ENCOUNTER (OUTPATIENT)
Dept: ULTRASOUND IMAGING | Facility: HOSPITAL | Age: 41
End: 2021-05-17

## 2021-05-26 ENCOUNTER — TELEPHONE (OUTPATIENT)
Dept: OBSTETRICS AND GYNECOLOGY | Facility: CLINIC | Age: 41
End: 2021-05-26

## 2021-05-26 NOTE — TELEPHONE ENCOUNTER
----- Message from Garcia Demarco MD sent at 5/25/2021  5:57 PM EDT -----  Kaylynn, we need to send letter/contact patient. She was not seen again today.  She has been very non-compliant with care and follow up of some very serious issues with the pregnancy. I need to make her aware that continue lack on her part would end in termination from the practice. Thanks, Dr. Demarco

## 2021-05-26 NOTE — TELEPHONE ENCOUNTER
Spoke to pt, had a family emergency. JAIME had a heart attack. She is scheduled for 05/27/2021.    Kaylynn

## 2021-05-27 ENCOUNTER — ROUTINE PRENATAL (OUTPATIENT)
Dept: OBSTETRICS AND GYNECOLOGY | Facility: CLINIC | Age: 41
End: 2021-05-27

## 2021-05-27 VITALS — BODY MASS INDEX: 35.74 KG/M2 | DIASTOLIC BLOOD PRESSURE: 74 MMHG | WEIGHT: 183 LBS | SYSTOLIC BLOOD PRESSURE: 125 MMHG

## 2021-05-27 DIAGNOSIS — O09.523 MULTIGRAVIDA OF ADVANCED MATERNAL AGE IN THIRD TRIMESTER: Primary | ICD-10-CM

## 2021-05-27 DIAGNOSIS — O09.30 LATE PRENATAL CARE: ICD-10-CM

## 2021-05-27 DIAGNOSIS — O09.893 SHORT INTERVAL BETWEEN PREGNANCIES COMPLICATING PREGNANCY, ANTEPARTUM, THIRD TRIMESTER: ICD-10-CM

## 2021-05-27 DIAGNOSIS — O99.013 ANEMIA IN PREGNANCY, THIRD TRIMESTER: ICD-10-CM

## 2021-05-27 DIAGNOSIS — O36.1930 MATERNAL ATYPICAL ANTIBODY AFFECTING PREGNANCY IN THIRD TRIMESTER, SINGLE OR UNSPECIFIED FETUS: ICD-10-CM

## 2021-05-27 DIAGNOSIS — Z98.891 PREVIOUS CESAREAN SECTION: ICD-10-CM

## 2021-05-27 DIAGNOSIS — Z36.9 ANTENATAL SCREENING ENCOUNTER: ICD-10-CM

## 2021-05-27 LAB
GLUCOSE UR STRIP-MCNC: NEGATIVE MG/DL
PROT UR STRIP-MCNC: ABNORMAL MG/DL

## 2021-05-27 PROCEDURE — 99214 OFFICE O/P EST MOD 30 MIN: CPT | Performed by: OBSTETRICS & GYNECOLOGY

## 2021-05-27 NOTE — PROGRESS NOTES
OB follow up     Laisha Alaniz is a 41 y.o.  34w3d being seen today for her obstetrical visit.  Patient reports no complaints. Fetal movement: normal.    Her prenatal care is complicated by (and status): AMA, Prior  (With successful ), Anti c-Ab, late care/non-compliant/short interval pregnancy     Review of Systems  Cramping/contractions : none   Vaginal bleeding: none   Fetal movement good     /74   Wt 83 kg (183 lb)   LMP 10/09/2020 (Approximate)   BMI 35.74 kg/m²     FHT: 154 BPM   Uterine Size: 32 cm       Assessment    Diagnoses and all orders for this visit:    1. Multigravida of advanced maternal age in third trimester (Primary)  -     US Fetal Biophysical Profile;Without Non-Stress Testing; Standing  -     US Ob Follow Up Transabdominal Approach    2. Previous  section    3. Late prenatal care    4. Short interval between pregnancies complicating pregnancy, antepartum, third trimester    5. 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  screening encounter  -     Gestational Screen 1 Hr ([facility])    7. Anemia in pregnancy, third trimester  -     CBC & Differential        1) pregnancy at 34w3d   Rh+, Has not done 1 hour, overdue to follow up mild anemia at intake.   2) Anti c- Ab  Reviewed MFM note of 21, she has NOT been compliant with care since that time!   Needs to repeat titer for her Anti-c to ensure not above 1:16, because would need MCA dopplers if that was an issue, if not can continue to monitor growth and BPP   But has not done scan since 21!   Stressed need to keep up with this aspect of her care.   [facility] B[facility] encourage (as per MFM)   3) AMA - Normal NIPT - but 41 so increased risk of stillbirth   Should have weekly BPP for this alone   4) Prior     Desires , but issue is likely need to deliver at specific time   Per MFM can not go past 38 6/7 weeks due to risk of stillbirth from Anti-c   38 6/7 so last available is !   May have to set repeat on that date, if not in labor by then   Discussed concern with induction   4) Anemia in pregnancy   Need to repeat CBC, can not do indicies per insurance if not below 10.0 grams, which was not on intake, see information above.   5) obesity in pregnancy   Good overall and interval gain     NEEDS   - 1 hour for GDM   - Rescreen anemia with CBC  - Repeat titer - Anti - c with follow up based on level   - Growth scan was in wrong order   - weekly BPP starting this week           Plan    Reviewed this stage of pregnancy  Problem list updated   Follow up in 1 weeks    Garcia Demarco MD   2021  15:36 EDT

## 2021-05-28 ENCOUNTER — TELEPHONE (OUTPATIENT)
Dept: OBSTETRICS AND GYNECOLOGY | Facility: CLINIC | Age: 41
End: 2021-05-28

## 2021-05-28 LAB
BASOPHILS # BLD AUTO: 0.04 10*3/MM3 (ref 0–0.2)
BASOPHILS NFR BLD AUTO: 0.4 % (ref 0–1.5)
BLD GP AB SCN SERPL QL: NORMAL
BLD GP AB SCN SERPL QL: POSITIVE
BLOOD GROUP ANTIBODIES SERPL: ABNORMAL
EOSINOPHIL # BLD AUTO: 0.1 10*3/MM3 (ref 0–0.4)
EOSINOPHIL NFR BLD AUTO: 0.9 % (ref 0.3–6.2)
ERYTHROCYTE [DISTWIDTH] IN BLOOD BY AUTOMATED COUNT: 12.4 % (ref 12.3–15.4)
HCT VFR BLD AUTO: 34 % (ref 34–46.6)
HGB BLD-MCNC: 11.8 G/DL (ref 12–15.9)
IMM GRANULOCYTES # BLD AUTO: 0.08 10*3/MM3 (ref 0–0.05)
IMM GRANULOCYTES NFR BLD AUTO: 0.7 % (ref 0–0.5)
LYMPHOCYTES # BLD AUTO: 1.98 10*3/MM3 (ref 0.7–3.1)
LYMPHOCYTES NFR BLD AUTO: 17.5 % (ref 19.6–45.3)
MCH RBC QN AUTO: 31.4 PG (ref 26.6–33)
MCHC RBC AUTO-ENTMCNC: 34.7 G/DL (ref 31.5–35.7)
MCV RBC AUTO: 90.4 FL (ref 79–97)
MONOCYTES # BLD AUTO: 0.56 10*3/MM3 (ref 0.1–0.9)
MONOCYTES NFR BLD AUTO: 4.9 % (ref 5–12)
NEUTROPHILS # BLD AUTO: 8.56 10*3/MM3 (ref 1.7–7)
NEUTROPHILS NFR BLD AUTO: 75.6 % (ref 42.7–76)
NRBC BLD AUTO-RTO: 0 /100 WBC (ref 0–0.2)
PLATELET # BLD AUTO: 238 10*3/MM3 (ref 140–450)
RBC # BLD AUTO: 3.76 10*6/MM3 (ref 3.77–5.28)
WBC # BLD AUTO: 11.32 10*3/MM3 (ref 3.4–10.8)
XXX BLOOD GROUP AB TITR SERPL AHG: ABNORMAL {TITER}

## 2021-05-28 NOTE — TELEPHONE ENCOUNTER
----- Message from Garcia Demarco MD sent at 5/27/2021  4:06 PM EDT -----  MICHEAL Chaidez reviewed at the time of her visit. Thanks, Dr. Demarco

## 2021-05-28 NOTE — TELEPHONE ENCOUNTER
----- Message from Garcia Demarco MD sent at 5/28/2021  3:24 PM EDT -----  Kaylynn, Great news- her Anti-c is still just at too weak to titer. So very low and she is not really even anemic with Hg of 11.8 grams! Please let her know. Still needs to make sure and do the BPP with visit with us next week. Thanks, Dr. Demarco

## 2021-06-04 ENCOUNTER — TELEPHONE (OUTPATIENT)
Dept: OBSTETRICS AND GYNECOLOGY | Facility: CLINIC | Age: 41
End: 2021-06-04

## 2021-06-04 NOTE — TELEPHONE ENCOUNTER
Patient called, she is stuck at the ER with her daughter and is probably not going to be able to make her appt this afternoon. She wanted to know if it would be ok for her to come in later today for just the U/S this week? Or what you might recommend?    Please advise,  Thank you

## 2021-06-04 NOTE — TELEPHONE ENCOUNTER
Kaylynn,     Just keep appointment with us as soon as we can next week to review.     Thanks,   Dr. Demarco

## 2021-06-08 ENCOUNTER — TELEPHONE (OUTPATIENT)
Dept: OBSTETRICS AND GYNECOLOGY | Facility: CLINIC | Age: 41
End: 2021-06-08

## 2021-06-08 NOTE — TELEPHONE ENCOUNTER
Arleen,    Could you call her and let her know I was just calling to encourage her to keep her next appt.    Thanks,  Kaylynn

## 2021-06-08 NOTE — TELEPHONE ENCOUNTER
Jarrett Chaidez,  Patient called stating that you called yesterday afternoon (6/7) around 5:20 pm.I could not find any notes. Would you please let me know if I need to relay any info to patient?     Thank you,  Arleen

## 2021-06-11 ENCOUNTER — ROUTINE PRENATAL (OUTPATIENT)
Dept: OBSTETRICS AND GYNECOLOGY | Facility: CLINIC | Age: 41
End: 2021-06-11

## 2021-06-11 VITALS — DIASTOLIC BLOOD PRESSURE: 74 MMHG | SYSTOLIC BLOOD PRESSURE: 130 MMHG | WEIGHT: 185 LBS | BODY MASS INDEX: 36.13 KG/M2

## 2021-06-11 DIAGNOSIS — O36.1930 MATERNAL ATYPICAL ANTIBODY AFFECTING PREGNANCY IN THIRD TRIMESTER, SINGLE OR UNSPECIFIED FETUS: ICD-10-CM

## 2021-06-11 DIAGNOSIS — O09.893 SHORT INTERVAL BETWEEN PREGNANCIES COMPLICATING PREGNANCY, ANTEPARTUM, THIRD TRIMESTER: ICD-10-CM

## 2021-06-11 DIAGNOSIS — O99.013 ANEMIA IN PREGNANCY, THIRD TRIMESTER: ICD-10-CM

## 2021-06-11 DIAGNOSIS — Z36.9 ANTENATAL SCREENING ENCOUNTER: ICD-10-CM

## 2021-06-11 DIAGNOSIS — O09.523 MULTIGRAVIDA OF ADVANCED MATERNAL AGE IN THIRD TRIMESTER: Primary | ICD-10-CM

## 2021-06-11 DIAGNOSIS — O09.30 LATE PRENATAL CARE: ICD-10-CM

## 2021-06-11 DIAGNOSIS — Z98.891 PREVIOUS CESAREAN SECTION: ICD-10-CM

## 2021-06-11 LAB
GLUCOSE UR STRIP-MCNC: NEGATIVE MG/DL
PROT UR STRIP-MCNC: ABNORMAL MG/DL

## 2021-06-11 PROCEDURE — 99214 OFFICE O/P EST MOD 30 MIN: CPT | Performed by: OBSTETRICS & GYNECOLOGY

## 2021-06-11 NOTE — PROGRESS NOTES
"Ob follow up    Laisha Alaniz is a 41 y.o.  36w4d patient being seen today for her obstetrical visit. Patient reports Thai Dorsey.. Fetal movement: normal.    Her prenatal care is complicated by (and status) : AMA, prior , Anti-c Ab, late care/short interval pregnancy       ROS -   Fetal Movement Good   Vaginal bleeding none   Cramping/Contractions intermittent      /74   Wt 83.9 kg (185 lb)   LMP 10/09/2020 (Approximate)   BMI 36.13 kg/m²     FHT:  144 BPM    Uterine Size: 33 cm   Presentations: cephalic   Pelvic Exam:     Dilation: 1cm    Effacement: Long    Station:  -2                 Assessment    Diagnoses and all orders for this visit:    1. Multigravida of advanced maternal age in third trimester (Primary)  -     Strep B Screen - Swab, Vaginal/Rectum    2. 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 Late prenatal care    4. Anemia in pregnancy, third trimester    5. Previous  section    6. Short interval between pregnancies complicating pregnancy, antepartum, third trimester    7.  screening encounter        1) Pregnancy at 36w4d  Passed 3 hour  2) Fetal status reassuring   3) GBS status - done today  4) Anti-c Ab.   Last check on , Titer still \"too weak to titer\"  BPP today cephalic, BPP 8/8, Normal LI  Likely needs to repeat in next 1-2 weeks   5) AMA   Normal NIPT  6) Prior    Wants to , Per MFM needs to deliver by 38 6/7 weeks   She wanted to wait until then to see if labored and could successfully    Has 'd before succussfully.   Scheduled repeat 21 (will not induce, so delivery if scheduled is repeat )  7) Anemia in pregnancy   No longer issue. Her Hg up to 11.8 grams     - Continue weekly BPP, serial growth q 4 weeks   - Repeat Antibody titer soon (1-2 weeks)   - Consider if okay to continue to wait until  to see if labors on own for . "           Plan    Labor warnings   [facility]        Garcia Demarco MD   6/11/2021  14:45 EDT

## 2021-06-13 LAB — GP B STREP DNA SPEC QL NAA+PROBE: NEGATIVE

## 2021-06-17 ENCOUNTER — ROUTINE PRENATAL (OUTPATIENT)
Dept: OBSTETRICS AND GYNECOLOGY | Facility: CLINIC | Age: 41
End: 2021-06-17

## 2021-06-17 VITALS — SYSTOLIC BLOOD PRESSURE: 151 MMHG | WEIGHT: 187 LBS | BODY MASS INDEX: 36.52 KG/M2 | DIASTOLIC BLOOD PRESSURE: 69 MMHG

## 2021-06-17 DIAGNOSIS — Z3A.37 37 WEEKS GESTATION OF PREGNANCY: ICD-10-CM

## 2021-06-17 DIAGNOSIS — O09.523 MULTIGRAVIDA OF ADVANCED MATERNAL AGE IN THIRD TRIMESTER: ICD-10-CM

## 2021-06-17 DIAGNOSIS — O36.1930 MATERNAL ATYPICAL ANTIBODY AFFECTING PREGNANCY IN THIRD TRIMESTER, SINGLE OR UNSPECIFIED FETUS: Primary | ICD-10-CM

## 2021-06-17 DIAGNOSIS — Z98.891 PREVIOUS CESAREAN SECTION: ICD-10-CM

## 2021-06-17 DIAGNOSIS — R03.0 ELEVATED BP WITHOUT DIAGNOSIS OF HYPERTENSION: ICD-10-CM

## 2021-06-17 LAB
GLUCOSE UR STRIP-MCNC: NEGATIVE MG/DL
PROT UR STRIP-MCNC: ABNORMAL MG/DL

## 2021-06-17 PROCEDURE — 99214 OFFICE O/P EST MOD 30 MIN: CPT | Performed by: NURSE PRACTITIONER

## 2021-06-17 NOTE — PROGRESS NOTES
OB follow up     Laisha Alaniz is a 41 y.o.  37w3d being seen today for her obstetrical visit.  Patient reports no complaints. Fetal movement: normal.      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 Denies HA, vision changes, RUQ pain. Reviewed pre-eclampsia precautions she will f/u on Monday (21) for BP check and has appt scheduled 21 with Dr Demarco  Will repeat antibody screen today  GBS negative  Reviewed fetal kick counts  Reviewed S&S of labor  Reviewed this stage of pregnancy  Problem list updated     Follow up 21 for BP check and in one week with Dr Demarco    I have spent 20 min in face to face time with the patient and 20 min of this time was spent in counseling on the above stated issues.    Idalmis Navarro, APRN  2021  14:23 EDT

## 2021-06-18 LAB
BLD GP AB SCN SERPL QL: NORMAL
BLD GP AB SCN SERPL QL: POSITIVE
BLOOD GROUP ANTIBODIES SERPL: ABNORMAL
XXX BLOOD GROUP AB TITR SERPL AHG: ABNORMAL {TITER}

## 2021-06-25 ENCOUNTER — ROUTINE PRENATAL (OUTPATIENT)
Dept: OBSTETRICS AND GYNECOLOGY | Facility: CLINIC | Age: 41
End: 2021-06-25

## 2021-06-25 VITALS — BODY MASS INDEX: 37.11 KG/M2 | DIASTOLIC BLOOD PRESSURE: 74 MMHG | WEIGHT: 190 LBS | SYSTOLIC BLOOD PRESSURE: 125 MMHG

## 2021-06-25 DIAGNOSIS — O09.30 LATE PRENATAL CARE: ICD-10-CM

## 2021-06-25 DIAGNOSIS — O36.1930 MATERNAL ATYPICAL ANTIBODY AFFECTING PREGNANCY IN THIRD TRIMESTER, SINGLE OR UNSPECIFIED FETUS: ICD-10-CM

## 2021-06-25 DIAGNOSIS — O09.523 AMA (ADVANCED MATERNAL AGE) MULTIGRAVIDA 35+, THIRD TRIMESTER: Primary | ICD-10-CM

## 2021-06-25 DIAGNOSIS — Z98.891 PREVIOUS CESAREAN SECTION: ICD-10-CM

## 2021-06-25 DIAGNOSIS — O09.893 SHORT INTERVAL BETWEEN PREGNANCIES COMPLICATING PREGNANCY, ANTEPARTUM, THIRD TRIMESTER: ICD-10-CM

## 2021-06-25 LAB
GLUCOSE UR STRIP-MCNC: NEGATIVE MG/DL
PROT UR STRIP-MCNC: ABNORMAL MG/DL

## 2021-06-25 PROCEDURE — 99214 OFFICE O/P EST MOD 30 MIN: CPT | Performed by: OBSTETRICS & GYNECOLOGY

## 2021-06-25 RX ORDER — CARBOPROST TROMETHAMINE 250 UG/ML
250 INJECTION, SOLUTION INTRAMUSCULAR AS NEEDED
Status: CANCELLED | OUTPATIENT
Start: 2021-06-25

## 2021-06-25 RX ORDER — OXYTOCIN 10 [USP'U]/ML
250 INJECTION, SOLUTION INTRAMUSCULAR; INTRAVENOUS CONTINUOUS PRN
Status: CANCELLED | OUTPATIENT
Start: 2021-06-25 | End: 2021-06-25

## 2021-06-25 RX ORDER — OXYTOCIN 10 [USP'U]/ML
125 INJECTION, SOLUTION INTRAMUSCULAR; INTRAVENOUS CONTINUOUS PRN
Status: CANCELLED | OUTPATIENT
Start: 2021-06-25

## 2021-06-25 RX ORDER — OXYTOCIN 10 [USP'U]/ML
999 INJECTION, SOLUTION INTRAMUSCULAR; INTRAVENOUS ONCE
Status: CANCELLED | OUTPATIENT
Start: 2021-06-25 | End: 2021-06-25

## 2021-06-25 RX ORDER — MISOPROSTOL 100 UG/1
800 TABLET ORAL AS NEEDED
Status: CANCELLED | OUTPATIENT
Start: 2021-06-25

## 2021-06-25 RX ORDER — SODIUM CHLORIDE 0.9 % (FLUSH) 0.9 %
10 SYRINGE (ML) INJECTION AS NEEDED
Status: CANCELLED | OUTPATIENT
Start: 2021-06-25

## 2021-06-25 RX ORDER — LIDOCAINE HYDROCHLORIDE 10 MG/ML
5 INJECTION, SOLUTION EPIDURAL; INFILTRATION; INTRACAUDAL; PERINEURAL AS NEEDED
Status: CANCELLED | OUTPATIENT
Start: 2021-06-25

## 2021-06-25 RX ORDER — METHYLERGONOVINE MALEATE 0.2 MG/ML
200 INJECTION INTRAVENOUS ONCE AS NEEDED
Status: CANCELLED | OUTPATIENT
Start: 2021-06-25

## 2021-06-25 RX ORDER — SODIUM CHLORIDE 0.9 % (FLUSH) 0.9 %
3 SYRINGE (ML) INJECTION EVERY 12 HOURS SCHEDULED
Status: CANCELLED | OUTPATIENT
Start: 2021-06-25

## 2021-06-25 RX ORDER — SODIUM CHLORIDE, SODIUM LACTATE, POTASSIUM CHLORIDE, CALCIUM CHLORIDE 600; 310; 30; 20 MG/100ML; MG/100ML; MG/100ML; MG/100ML
125 INJECTION, SOLUTION INTRAVENOUS CONTINUOUS
Status: CANCELLED | OUTPATIENT
Start: 2021-06-25

## 2021-06-25 NOTE — PROGRESS NOTES
Ob follow up    Laisha Alaniz is a 41 y.o.  38w4d patient being seen today for her obstetrical visit. Patient reports no complaints. Fetal movement: normal.    Her prenatal care is complicated by (and status) : AMA, Prior , Anti-c Ab, late prenatal care/short interval pregnancy       ROS -   Fetal Movement good   Vaginal bleeding none   Cramping/Contractions intermittent      /74   Wt 86.2 kg (190 lb)   LMP 10/09/2020 (Approximate)   BMI 37.11 kg/m²     FHT:  134 BPM    Uterine Size: 34 cm   Presentations: cephalic   Pelvic Exam:     Dilation: 1cm    Effacement: 25%    Station:  -2                 Assessment    Diagnoses and all orders for this visit:    1. AMA (advanced maternal age) multigravida 35+, third trimester (Primary)  -     Case Request; Standing  -     lidocaine PF 1% (XYLOCAINE) injection 5 mL  -     sodium chloride 0.9 % flush 3 mL  -     sodium chloride 0.9 % flush 10 mL  -     lactated ringers bolus 1,000 mL  -     lactated ringers infusion  -     oxytocin (PITOCIN) injection  -     oxytocin (PITOCIN) injection  -     oxytocin (PITOCIN) injection  -     methylergonovine (METHERGINE) injection 200 mcg  -     carboprost (HEMABATE) injection 250 mcg  -     miSOPROStol (CYTOTEC) tablet 800 mcg  -     Case Request    2. 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 Previous  section  -     Case Request; Standing  -      lidocaine PF 1% (XYLOCAINE) injection 5 mL  -     sodium chloride 0.9 % flush 3 mL  -     sodium chloride 0.9 % flush 10 mL  -     lactated ringers bolus 1,000 mL  -     lactated ringers infusion  -     oxytocin (PITOCIN) injection  -     oxytocin (PITOCIN) injection  -     oxytocin (PITOCIN) injection  -     methylergonovine (METHERGINE) injection 200 mcg  -     carboprost (HEMABATE) injection 250 mcg  -     miSOPROStol (CYTOTEC) tablet 800 mcg  -     Case Request    4. Late prenatal care    5. Short interval between pregnancies complicating pregnancy, antepartum, third trimester    Other orders  -     Admit To Obstetrics Inpatient; Standing  -     Obtain Informed Consent; Standing  -     Vital Signs Per Hospital Policy; Standing  -     Initiate Group Beta Strep (GBS) Prophylaxis Protocol, If Criteria Met; Standing  -     Insert Indwelling Urinary Catheter; Standing  -     Assess Need for Indwelling Urinary Catheter - Follow Removal Protocol; Standing  -     Urinary Catheter Care; Standing  -     Abdominal Prep with Clippers; Standing  -     Chlorhexadine Skin Prep Unless Otherwise Indicated; Standing  -     SCD (sequential compression devices); Standing  -     POC Glucose Once; Standing  -     Continuous Fetal Monitoring With NST on Admission and Prior to Initiation of Oxytocin.; Standing  -     External Uterine Contraction Monitoring; Standing  -     Notify Physician (specified); Standing  -     Notify physician for tachysystole (per hospital algorithm); Standing  -     Notify physician if membranes ruptured, bleeding greater than 1 pad an hour, fetal heart tone abnormality, and severe pain; Standing  -     NPO Diet NPO Except: Ice chips; Standing  -     NPO Diet; Standing  -     Inpatient Consult to Anesthesiology; Standing  -     Type & Screen; Standing  -     CBC (No Diff); Standing  -     Insert Peripheral IV; Standing  -     Saline Lock & Maintain IV Access; Standing  -     Vital Signs Per Hospital Policy;  Standing  -     Strict Bed Rest; Standing  -     Fundal & Lochia Check; Standing  -     Fundal & Lochia Check; Standing  -     Notify Physician (specified); Standing  -     Diet Regular; Standing        1) Pregnancy at 38w4d  2) Fetal status reassuring   3) GBS status - negative  4) Anti-c Ab  Last week was still present but too weak to titer  Growth and BPP today - see below reassuring.   5) Prior    Hx of successful ,   [facility] recommending delivery by 39 0/7 weeks - which is Monday.   Not willing to induce a . So going to schedule for delivery Monday by repeat    Declines tubal in past and today  6) AMA- Normal NIPT      Growth and BPP today   AGA - 55%. A/C 66%. Normal LI, cephalic and posterior placenta   BPP     Repeat set for  at 8 AM     Schedule for delivery   4) missed appointment on Monday, she went to [facility] and checked her B/P and it was 130/69 on 2021.      Plan    Labor warnings   [facility] B[facility]        Garcia Demarco MD   2021  14:47 EDT

## 2021-06-26 ENCOUNTER — ANESTHESIA EVENT (OUTPATIENT)
Dept: LABOR AND DELIVERY | Facility: HOSPITAL | Age: 41
End: 2021-06-26

## 2021-06-27 ENCOUNTER — ANESTHESIA (OUTPATIENT)
Dept: LABOR AND DELIVERY | Facility: HOSPITAL | Age: 41
End: 2021-06-27

## 2021-06-27 ENCOUNTER — HOSPITAL ENCOUNTER (INPATIENT)
Facility: HOSPITAL | Age: 41
LOS: 2 days | Discharge: HOME | End: 2021-06-29
Attending: OBSTETRICS & GYNECOLOGY | Admitting: OBSTETRICS & GYNECOLOGY

## 2021-06-27 DIAGNOSIS — Z98.891 S/P CESAREAN SECTION: Primary | ICD-10-CM

## 2021-06-27 DIAGNOSIS — O09.523 AMA (ADVANCED MATERNAL AGE) MULTIGRAVIDA 35+, THIRD TRIMESTER: ICD-10-CM

## 2021-06-27 DIAGNOSIS — O36.1930 MATERNAL ATYPICAL ANTIBODY AFFECTING PREGNANCY IN THIRD TRIMESTER, SINGLE OR UNSPECIFIED FETUS: ICD-10-CM

## 2021-06-27 DIAGNOSIS — Z98.891 PREVIOUS CESAREAN SECTION: ICD-10-CM

## 2021-06-27 LAB
ABO GROUP BLD: NORMAL
BLD GP AB SCN SERPL QL: NEGATIVE
DEPRECATED RDW RBC AUTO: 42.8 FL (ref 37–54)
ERYTHROCYTE [DISTWIDTH] IN BLOOD BY AUTOMATED COUNT: 13 % (ref 12.3–15.4)
HCT VFR BLD AUTO: 35 % (ref 34–46.6)
HGB BLD-MCNC: 11.6 G/DL (ref 12–15.9)
MCH RBC QN AUTO: 30.4 PG (ref 26.6–33)
MCHC RBC AUTO-ENTMCNC: 33.1 G/DL (ref 31.5–35.7)
MCV RBC AUTO: 91.6 FL (ref 79–97)
PLATELET # BLD AUTO: 210 10*3/MM3 (ref 140–450)
PMV BLD AUTO: 10.4 FL (ref 6–12)
RBC # BLD AUTO: 3.82 10*6/MM3 (ref 3.77–5.28)
RH BLD: POSITIVE
SARS-COV-2 RNA PNL SPEC NAA+PROBE: NOT DETECTED
T&S EXPIRATION DATE: NORMAL
WBC # BLD AUTO: 9.63 10*3/MM3 (ref 3.4–10.8)

## 2021-06-27 PROCEDURE — 86922 COMPATIBILITY TEST ANTIGLOB: CPT

## 2021-06-27 PROCEDURE — 86850 RBC ANTIBODY SCREEN: CPT | Performed by: OBSTETRICS & GYNECOLOGY

## 2021-06-27 PROCEDURE — 25010000002 FENTANYL CITRATE (PF) 50 MCG/ML SOLUTION: Performed by: ANESTHESIOLOGY

## 2021-06-27 PROCEDURE — 25010000002 MORPHINE PER 10 MG: Performed by: ANESTHESIOLOGY

## 2021-06-27 PROCEDURE — 86901 BLOOD TYPING SEROLOGIC RH(D): CPT | Performed by: OBSTETRICS & GYNECOLOGY

## 2021-06-27 PROCEDURE — 25010000002 ONDANSETRON PER 1 MG: Performed by: NURSE ANESTHETIST, CERTIFIED REGISTERED

## 2021-06-27 PROCEDURE — 59515 CESAREAN DELIVERY: CPT | Performed by: OBSTETRICS & GYNECOLOGY

## 2021-06-27 PROCEDURE — 86920 COMPATIBILITY TEST SPIN: CPT

## 2021-06-27 PROCEDURE — 86900 BLOOD TYPING SEROLOGIC ABO: CPT | Performed by: OBSTETRICS & GYNECOLOGY

## 2021-06-27 PROCEDURE — 25010000002 DIPHENHYDRAMINE PER 50 MG: Performed by: NURSE ANESTHETIST, CERTIFIED REGISTERED

## 2021-06-27 PROCEDURE — 25010000002 ONDANSETRON PER 1 MG: Performed by: ANESTHESIOLOGY

## 2021-06-27 PROCEDURE — 87635 SARS-COV-2 COVID-19 AMP PRB: CPT | Performed by: OBSTETRICS & GYNECOLOGY

## 2021-06-27 PROCEDURE — 25010000002 PHENYLEPHRINE PER 1 ML: Performed by: NURSE ANESTHETIST, CERTIFIED REGISTERED

## 2021-06-27 PROCEDURE — 25010000002 DEXAMETHASONE PER 1 MG: Performed by: NURSE ANESTHETIST, CERTIFIED REGISTERED

## 2021-06-27 PROCEDURE — 85027 COMPLETE CBC AUTOMATED: CPT | Performed by: OBSTETRICS & GYNECOLOGY

## 2021-06-27 PROCEDURE — 86902 BLOOD TYPE ANTIGEN DONOR EA: CPT

## 2021-06-27 PROCEDURE — 25010000003 CEFAZOLIN IN DEXTROSE 2-4 GM/100ML-% SOLUTION: Performed by: OBSTETRICS & GYNECOLOGY

## 2021-06-27 RX ORDER — BUPIVACAINE HYDROCHLORIDE 7.5 MG/ML
INJECTION, SOLUTION EPIDURAL; RETROBULBAR
Status: COMPLETED | OUTPATIENT
Start: 2021-06-27 | End: 2021-06-27

## 2021-06-27 RX ORDER — SODIUM CHLORIDE, SODIUM LACTATE, POTASSIUM CHLORIDE, CALCIUM CHLORIDE 600; 310; 30; 20 MG/100ML; MG/100ML; MG/100ML; MG/100ML
125 INJECTION, SOLUTION INTRAVENOUS CONTINUOUS
Status: DISCONTINUED | OUTPATIENT
Start: 2021-06-27 | End: 2021-06-29 | Stop reason: HOSPADM

## 2021-06-27 RX ORDER — DIPHENHYDRAMINE HYDROCHLORIDE 50 MG/ML
25 INJECTION INTRAMUSCULAR; INTRAVENOUS EVERY 4 HOURS PRN
Status: DISCONTINUED | OUTPATIENT
Start: 2021-06-27 | End: 2021-06-29 | Stop reason: HOSPADM

## 2021-06-27 RX ORDER — CEFAZOLIN SODIUM 2 G/100ML
2 INJECTION, SOLUTION INTRAVENOUS ONCE
Status: COMPLETED | OUTPATIENT
Start: 2021-06-27 | End: 2021-06-27

## 2021-06-27 RX ORDER — FAMOTIDINE 10 MG/ML
20 INJECTION, SOLUTION INTRAVENOUS ONCE AS NEEDED
Status: COMPLETED | OUTPATIENT
Start: 2021-06-27 | End: 2021-06-27

## 2021-06-27 RX ORDER — MORPHINE SULFATE 1 MG/ML
INJECTION, SOLUTION EPIDURAL; INTRATHECAL; INTRAVENOUS
Status: COMPLETED | OUTPATIENT
Start: 2021-06-27 | End: 2021-06-27

## 2021-06-27 RX ORDER — DEXAMETHASONE SODIUM PHOSPHATE 4 MG/ML
INJECTION, SOLUTION INTRA-ARTICULAR; INTRALESIONAL; INTRAMUSCULAR; INTRAVENOUS; SOFT TISSUE AS NEEDED
Status: DISCONTINUED | OUTPATIENT
Start: 2021-06-27 | End: 2021-06-27 | Stop reason: SURG

## 2021-06-27 RX ORDER — IBUPROFEN 800 MG/1
800 TABLET ORAL EVERY 8 HOURS PRN
Status: DISCONTINUED | OUTPATIENT
Start: 2021-06-27 | End: 2021-06-29 | Stop reason: HOSPADM

## 2021-06-27 RX ORDER — SIMETHICONE 80 MG
80 TABLET,CHEWABLE ORAL 4 TIMES DAILY PRN
Status: DISCONTINUED | OUTPATIENT
Start: 2021-06-27 | End: 2021-06-29 | Stop reason: HOSPADM

## 2021-06-27 RX ORDER — METHYLERGONOVINE MALEATE 0.2 MG/ML
200 INJECTION INTRAVENOUS ONCE AS NEEDED
Status: DISCONTINUED | OUTPATIENT
Start: 2021-06-27 | End: 2021-06-27 | Stop reason: HOSPADM

## 2021-06-27 RX ORDER — NALOXONE HCL 0.4 MG/ML
0.2 VIAL (ML) INJECTION
Status: DISCONTINUED | OUTPATIENT
Start: 2021-06-27 | End: 2021-06-29 | Stop reason: HOSPADM

## 2021-06-27 RX ORDER — OXYTOCIN-SODIUM CHLORIDE 0.9% IV SOLN 30 UNIT/500ML 30-0.9/5 UT/ML-%
125 SOLUTION INTRAVENOUS CONTINUOUS PRN
Status: DISCONTINUED | OUTPATIENT
Start: 2021-06-27 | End: 2021-06-27 | Stop reason: HOSPADM

## 2021-06-27 RX ORDER — SODIUM CHLORIDE 0.9 % (FLUSH) 0.9 %
3 SYRINGE (ML) INJECTION EVERY 12 HOURS SCHEDULED
Status: DISCONTINUED | OUTPATIENT
Start: 2021-06-27 | End: 2021-06-27 | Stop reason: HOSPADM

## 2021-06-27 RX ORDER — OXYCODONE HYDROCHLORIDE AND ACETAMINOPHEN 5; 325 MG/1; MG/1
1 TABLET ORAL EVERY 4 HOURS PRN
Status: DISCONTINUED | OUTPATIENT
Start: 2021-06-27 | End: 2021-06-29 | Stop reason: HOSPADM

## 2021-06-27 RX ORDER — PRENATAL VIT/IRON FUM/FOLIC AC 27MG-0.8MG
1 TABLET ORAL DAILY
Status: DISCONTINUED | OUTPATIENT
Start: 2021-06-27 | End: 2021-06-29 | Stop reason: HOSPADM

## 2021-06-27 RX ORDER — ERYTHROMYCIN 5 MG/G
OINTMENT OPHTHALMIC
Status: DISPENSED
Start: 2021-06-27 | End: 2021-06-27

## 2021-06-27 RX ORDER — PROMETHAZINE HYDROCHLORIDE 12.5 MG/1
12.5 SUPPOSITORY RECTAL EVERY 6 HOURS PRN
Status: DISCONTINUED | OUTPATIENT
Start: 2021-06-27 | End: 2021-06-29 | Stop reason: HOSPADM

## 2021-06-27 RX ORDER — EPHEDRINE SULFATE 50 MG/ML
INJECTION, SOLUTION INTRAVENOUS AS NEEDED
Status: DISCONTINUED | OUTPATIENT
Start: 2021-06-27 | End: 2021-06-27 | Stop reason: SURG

## 2021-06-27 RX ORDER — SODIUM CHLORIDE 0.9 % (FLUSH) 0.9 %
10 SYRINGE (ML) INJECTION AS NEEDED
Status: DISCONTINUED | OUTPATIENT
Start: 2021-06-27 | End: 2021-06-27 | Stop reason: HOSPADM

## 2021-06-27 RX ORDER — MEDROXYPROGESTERONE ACETATE 150 MG/ML
150 INJECTION, SUSPENSION INTRAMUSCULAR ONCE
Status: DISCONTINUED | OUTPATIENT
Start: 2021-06-28 | End: 2021-06-29 | Stop reason: HOSPADM

## 2021-06-27 RX ORDER — DIPHENHYDRAMINE HCL 25 MG
25 CAPSULE ORAL EVERY 4 HOURS PRN
Status: DISCONTINUED | OUTPATIENT
Start: 2021-06-27 | End: 2021-06-29 | Stop reason: HOSPADM

## 2021-06-27 RX ORDER — ONDANSETRON 2 MG/ML
INJECTION INTRAMUSCULAR; INTRAVENOUS AS NEEDED
Status: DISCONTINUED | OUTPATIENT
Start: 2021-06-27 | End: 2021-06-27 | Stop reason: SURG

## 2021-06-27 RX ORDER — FENTANYL CITRATE 50 UG/ML
INJECTION, SOLUTION INTRAMUSCULAR; INTRAVENOUS
Status: COMPLETED | OUTPATIENT
Start: 2021-06-27 | End: 2021-06-27

## 2021-06-27 RX ORDER — HYDROXYZINE 50 MG/1
50 TABLET, FILM COATED ORAL EVERY 6 HOURS PRN
Status: DISCONTINUED | OUTPATIENT
Start: 2021-06-27 | End: 2021-06-29 | Stop reason: HOSPADM

## 2021-06-27 RX ORDER — ACETAMINOPHEN 500 MG
1000 TABLET ORAL ONCE
Status: COMPLETED | OUTPATIENT
Start: 2021-06-27 | End: 2021-06-27

## 2021-06-27 RX ORDER — HYDROCORTISONE 25 MG/G
CREAM TOPICAL 3 TIMES DAILY PRN
Status: DISCONTINUED | OUTPATIENT
Start: 2021-06-27 | End: 2021-06-29 | Stop reason: HOSPADM

## 2021-06-27 RX ORDER — LIDOCAINE HYDROCHLORIDE 10 MG/ML
5 INJECTION, SOLUTION EPIDURAL; INFILTRATION; INTRACAUDAL; PERINEURAL AS NEEDED
Status: DISCONTINUED | OUTPATIENT
Start: 2021-06-27 | End: 2021-06-27 | Stop reason: HOSPADM

## 2021-06-27 RX ORDER — CARBOPROST TROMETHAMINE 250 UG/ML
250 INJECTION, SOLUTION INTRAMUSCULAR AS NEEDED
Status: DISCONTINUED | OUTPATIENT
Start: 2021-06-27 | End: 2021-06-27 | Stop reason: HOSPADM

## 2021-06-27 RX ORDER — ONDANSETRON 2 MG/ML
4 INJECTION INTRAMUSCULAR; INTRAVENOUS ONCE AS NEEDED
Status: COMPLETED | OUTPATIENT
Start: 2021-06-27 | End: 2021-06-27

## 2021-06-27 RX ORDER — SODIUM CHLORIDE, SODIUM LACTATE, POTASSIUM CHLORIDE, CALCIUM CHLORIDE 600; 310; 30; 20 MG/100ML; MG/100ML; MG/100ML; MG/100ML
125 INJECTION, SOLUTION INTRAVENOUS CONTINUOUS
Status: DISCONTINUED | OUTPATIENT
Start: 2021-06-27 | End: 2021-06-27

## 2021-06-27 RX ORDER — HYDROMORPHONE HYDROCHLORIDE 1 MG/ML
0.5 INJECTION, SOLUTION INTRAMUSCULAR; INTRAVENOUS; SUBCUTANEOUS
Status: ACTIVE | OUTPATIENT
Start: 2021-06-27 | End: 2021-06-28

## 2021-06-27 RX ORDER — OXYTOCIN-SODIUM CHLORIDE 0.9% IV SOLN 30 UNIT/500ML 30-0.9/5 UT/ML-%
250 SOLUTION INTRAVENOUS CONTINUOUS PRN
Status: ACTIVE | OUTPATIENT
Start: 2021-06-27 | End: 2021-06-27

## 2021-06-27 RX ORDER — OXYTOCIN-SODIUM CHLORIDE 0.9% IV SOLN 30 UNIT/500ML 30-0.9/5 UT/ML-%
999 SOLUTION INTRAVENOUS ONCE
Status: COMPLETED | OUTPATIENT
Start: 2021-06-27 | End: 2021-06-27

## 2021-06-27 RX ORDER — MORPHINE SULFATE 2 MG/ML
2 INJECTION, SOLUTION INTRAMUSCULAR; INTRAVENOUS
Status: ACTIVE | OUTPATIENT
Start: 2021-06-27 | End: 2021-06-28

## 2021-06-27 RX ORDER — FERROUS SULFATE 325(65) MG
325 TABLET ORAL
Status: DISCONTINUED | OUTPATIENT
Start: 2021-06-28 | End: 2021-06-29 | Stop reason: HOSPADM

## 2021-06-27 RX ORDER — DIPHENHYDRAMINE HYDROCHLORIDE 50 MG/ML
INJECTION INTRAMUSCULAR; INTRAVENOUS AS NEEDED
Status: DISCONTINUED | OUTPATIENT
Start: 2021-06-27 | End: 2021-06-27 | Stop reason: SURG

## 2021-06-27 RX ORDER — MISOPROSTOL 200 UG/1
800 TABLET ORAL AS NEEDED
Status: DISCONTINUED | OUTPATIENT
Start: 2021-06-27 | End: 2021-06-27 | Stop reason: HOSPADM

## 2021-06-27 RX ORDER — PHYTONADIONE 1 MG/.5ML
INJECTION, EMULSION INTRAMUSCULAR; INTRAVENOUS; SUBCUTANEOUS
Status: DISPENSED
Start: 2021-06-27 | End: 2021-06-27

## 2021-06-27 RX ORDER — PROMETHAZINE HYDROCHLORIDE 25 MG/1
25 TABLET ORAL EVERY 6 HOURS PRN
Status: DISCONTINUED | OUTPATIENT
Start: 2021-06-27 | End: 2021-06-29 | Stop reason: HOSPADM

## 2021-06-27 RX ADMIN — DIPHENHYDRAMINE HYDROCHLORIDE 12.5 MG: 50 INJECTION INTRAMUSCULAR; INTRAVENOUS at 08:59

## 2021-06-27 RX ADMIN — ONDANSETRON 4 MG: 2 INJECTION INTRAMUSCULAR; INTRAVENOUS at 15:12

## 2021-06-27 RX ADMIN — FENTANYL CITRATE 20 MCG: 50 INJECTION INTRAMUSCULAR; INTRAVENOUS at 08:18

## 2021-06-27 RX ADMIN — ONDANSETRON 4 MG: 2 INJECTION INTRAMUSCULAR; INTRAVENOUS at 07:27

## 2021-06-27 RX ADMIN — PHENYLEPHRINE HYDROCHLORIDE 200 MCG: 10 INJECTION INTRAVENOUS at 08:27

## 2021-06-27 RX ADMIN — FAMOTIDINE 20 MG: 10 INJECTION INTRAVENOUS at 07:27

## 2021-06-27 RX ADMIN — PHENYLEPHRINE HYDROCHLORIDE 100 MCG: 10 INJECTION INTRAVENOUS at 08:55

## 2021-06-27 RX ADMIN — MORPHINE SULFATE 250 MCG: 1 INJECTION, SOLUTION EPIDURAL; INTRATHECAL; INTRAVENOUS at 08:18

## 2021-06-27 RX ADMIN — ACETAMINOPHEN 1000 MG: 500 TABLET, FILM COATED ORAL at 07:27

## 2021-06-27 RX ADMIN — OXYTOCIN 999 ML/HR: 10 INJECTION INTRAVENOUS at 08:36

## 2021-06-27 RX ADMIN — CEFAZOLIN SODIUM 2 G: 2 INJECTION, SOLUTION INTRAVENOUS at 08:05

## 2021-06-27 RX ADMIN — PHENYLEPHRINE HYDROCHLORIDE 200 MCG: 10 INJECTION INTRAVENOUS at 08:46

## 2021-06-27 RX ADMIN — BUPIVACAINE HYDROCHLORIDE 1.6 ML: 7.5 INJECTION, SOLUTION EPIDURAL; RETROBULBAR at 08:18

## 2021-06-27 RX ADMIN — DEXAMETHASONE SODIUM PHOSPHATE 8 MG: 4 INJECTION INTRA-ARTICULAR; INTRALESIONAL; INTRAMUSCULAR; INTRAVENOUS; SOFT TISSUE at 08:48

## 2021-06-27 RX ADMIN — SODIUM CHLORIDE, POTASSIUM CHLORIDE, SODIUM LACTATE AND CALCIUM CHLORIDE 125 ML/HR: 600; 310; 30; 20 INJECTION, SOLUTION INTRAVENOUS at 15:15

## 2021-06-27 RX ADMIN — EPHEDRINE SULFATE 10 MG: 50 INJECTION INTRAVENOUS at 08:36

## 2021-06-27 RX ADMIN — PHENYLEPHRINE HYDROCHLORIDE 100 MCG: 10 INJECTION INTRAVENOUS at 08:43

## 2021-06-27 RX ADMIN — ONDANSETRON HYDROCHLORIDE 4 MG: 2 SOLUTION INTRAMUSCULAR; INTRAVENOUS at 08:48

## 2021-06-27 RX ADMIN — SODIUM CHLORIDE, POTASSIUM CHLORIDE, SODIUM LACTATE AND CALCIUM CHLORIDE 125 ML/HR: 600; 310; 30; 20 INJECTION, SOLUTION INTRAVENOUS at 07:19

## 2021-06-27 RX ADMIN — SODIUM CHLORIDE, POTASSIUM CHLORIDE, SODIUM LACTATE AND CALCIUM CHLORIDE 1000 ML: 600; 310; 30; 20 INJECTION, SOLUTION INTRAVENOUS at 06:30

## 2021-06-27 RX ADMIN — DIPHENHYDRAMINE HYDROCHLORIDE 12.5 MG: 50 INJECTION INTRAMUSCULAR; INTRAVENOUS at 08:55

## 2021-06-27 NOTE — ANESTHESIA POSTPROCEDURE EVALUATION
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        Post Anesthesia Care and Evaluation    Patient location during evaluation: PACU  Patient participation: complete - patient participated  Level of consciousness: awake  Pain management: adequate  Airway patency: patent  Anesthetic complications: No anesthetic complications    Cardiovascular status: acceptable  Respiratory status: acceptable  Hydration status: acceptable    Comments: /67   Pulse 67   Temp 36.4 °C (97.5 °F) (Oral)   Resp 18   Ht 152.4 cm (60\")   Wt 83.9 kg (185 lb)   LMP 10/09/2020 (Approximate)   SpO2 98%   Breastfeeding Yes   BMI 36.13 kg/m²         "

## 2021-06-27 NOTE — ANESTHESIA PREPROCEDURE EVALUATION
Anesthesia Evaluation     Patient summary reviewed and Nursing notes reviewed   NPO Solid Status: > 8 hours  NPO Liquid Status: > 2 hours           Airway   Mallampati: II  TM distance: >3 FB  Neck ROM: full  Dental - normal exam     Pulmonary - negative pulmonary ROS and normal exam    breath sounds clear to auscultation  Cardiovascular - negative cardio ROS and normal exam    Rhythm: regular  Rate: normal    (-) angina, orthopnea, PND, ROBERSON      Neuro/Psych  (+) headaches (Migraine),     GI/Hepatic/Renal/Endo    (+) obesity,       Musculoskeletal (-) negative ROS    Abdominal    Substance History - negative use     OB/GYN    (+) Pregnant,         Other - negative ROS                       Anesthesia Plan    ASA 2     spinal       Anesthetic plan, all risks, benefits, and alternatives have been provided, discussed and informed consent has been obtained with: patient.

## 2021-06-27 NOTE — ANESTHESIA PROCEDURE NOTES
Spinal Block      Patient reassessed immediately prior to procedure    Patient location during procedure: OR  Start Time: 6/27/2021 8:12 AM  Stop Time: 6/27/2021 8:18 AM  Indication:at surgeon's request  Performed By  Anesthesiologist: Frantz Briggs MD  Preanesthetic Checklist  Completed: patient identified, IV checked, site marked, risks and benefits discussed, surgical consent, monitors and equipment checked, pre-op evaluation and timeout performed  Spinal Block Prep:  Patient Position:sitting  Sterile Tech:cap, gloves, mask and sterile barriers  Prep:Chloraprep  Patient Monitoring:blood pressure monitoring and continuous pulse oximetry  Spinal Block Procedure  Approach:midline  Guidance:landmark technique  Location:L3-L4  Needle Type:Sprotte  Needle Gauge:24  Placement of Spinal needle event:cerebrospinal fluid aspirated  Paresthesia: no  Fluid Appearance:clear  Medications: fentaNYL citrate (PF) (SUBLIMAZE) injection, 20 mcg  Morphine PF injection, 250 mcg  bupivacaine PF (MARCAINE) 0.75 % injection, 1.6 mL  Med Administered at 6/27/2021 8:18 AM   Post Assessment  Patient Tolerance:patient tolerated the procedure well with no apparent complications  Complications no

## 2021-06-28 LAB
BASOPHILS # BLD AUTO: 0.02 10*3/MM3 (ref 0–0.2)
BASOPHILS NFR BLD AUTO: 0.2 % (ref 0–1.5)
DEPRECATED RDW RBC AUTO: 39.6 FL (ref 37–54)
EOSINOPHIL # BLD AUTO: 0.07 10*3/MM3 (ref 0–0.4)
EOSINOPHIL NFR BLD AUTO: 0.6 % (ref 0.3–6.2)
ERYTHROCYTE [DISTWIDTH] IN BLOOD BY AUTOMATED COUNT: 12.4 % (ref 12.3–15.4)
HCT VFR BLD AUTO: 28.7 % (ref 34–46.6)
HGB BLD-MCNC: 9.8 G/DL (ref 12–15.9)
IMM GRANULOCYTES # BLD AUTO: 0.08 10*3/MM3 (ref 0–0.05)
IMM GRANULOCYTES NFR BLD AUTO: 0.7 % (ref 0–0.5)
LYMPHOCYTES # BLD AUTO: 2.09 10*3/MM3 (ref 0.7–3.1)
LYMPHOCYTES NFR BLD AUTO: 17.4 % (ref 19.6–45.3)
MCH RBC QN AUTO: 30.1 PG (ref 26.6–33)
MCHC RBC AUTO-ENTMCNC: 34.1 G/DL (ref 31.5–35.7)
MCV RBC AUTO: 88 FL (ref 79–97)
MONOCYTES # BLD AUTO: 0.84 10*3/MM3 (ref 0.1–0.9)
MONOCYTES NFR BLD AUTO: 7 % (ref 5–12)
NEUTROPHILS NFR BLD AUTO: 74.1 % (ref 42.7–76)
NEUTROPHILS NFR BLD AUTO: 8.9 10*3/MM3 (ref 1.7–7)
NRBC BLD AUTO-RTO: 0 /100 WBC (ref 0–0.2)
PLATELET # BLD AUTO: 186 10*3/MM3 (ref 140–450)
PMV BLD AUTO: 10.4 FL (ref 6–12)
RBC # BLD AUTO: 3.26 10*6/MM3 (ref 3.77–5.28)
WBC # BLD AUTO: 12 10*3/MM3 (ref 3.4–10.8)

## 2021-06-28 PROCEDURE — 85025 COMPLETE CBC W/AUTO DIFF WBC: CPT | Performed by: OBSTETRICS & GYNECOLOGY

## 2021-06-28 PROCEDURE — 0503F POSTPARTUM CARE VISIT: CPT | Performed by: OBSTETRICS & GYNECOLOGY

## 2021-06-28 RX ORDER — DOCUSATE SODIUM 100 MG/1
100 CAPSULE, LIQUID FILLED ORAL 2 TIMES DAILY PRN
Status: DISCONTINUED | OUTPATIENT
Start: 2021-06-28 | End: 2021-06-29 | Stop reason: HOSPADM

## 2021-06-28 RX ADMIN — FERROUS SULFATE TAB 325 MG (65 MG ELEMENTAL FE) 325 MG: 325 (65 FE) TAB at 08:10

## 2021-06-28 RX ADMIN — IBUPROFEN 800 MG: 800 TABLET, FILM COATED ORAL at 08:10

## 2021-06-28 RX ADMIN — Medication 1 TABLET: at 08:10

## 2021-06-28 RX ADMIN — IBUPROFEN 800 MG: 800 TABLET, FILM COATED ORAL at 18:05

## 2021-06-28 RX ADMIN — DOCUSATE SODIUM 100 MG: 100 CAPSULE, LIQUID FILLED ORAL at 21:21

## 2021-06-29 VITALS
HEIGHT: 60 IN | BODY MASS INDEX: 36.32 KG/M2 | RESPIRATION RATE: 18 BRPM | WEIGHT: 185 LBS | TEMPERATURE: 98.7 F | HEART RATE: 82 BPM | OXYGEN SATURATION: 99 % | SYSTOLIC BLOOD PRESSURE: 145 MMHG | DIASTOLIC BLOOD PRESSURE: 80 MMHG

## 2021-06-29 PROCEDURE — 0503F POSTPARTUM CARE VISIT: CPT | Performed by: OBSTETRICS & GYNECOLOGY

## 2021-06-29 RX ORDER — OXYCODONE HYDROCHLORIDE AND ACETAMINOPHEN 5; 325 MG/1; MG/1
1 TABLET ORAL EVERY 4 HOURS PRN
Qty: 20 TABLET | Refills: 0 | Status: SHIPPED | OUTPATIENT
Start: 2021-06-29 | End: 2021-07-04

## 2021-06-29 RX ORDER — IBUPROFEN 800 MG/1
800 TABLET ORAL EVERY 8 HOURS PRN
Qty: 30 TABLET | Refills: 0 | Status: SHIPPED | OUTPATIENT
Start: 2021-06-29 | End: 2021-08-02

## 2021-06-29 RX ORDER — PSEUDOEPHEDRINE HCL 30 MG
100 TABLET ORAL 2 TIMES DAILY PRN
Qty: 60 CAPSULE | Refills: 0 | Status: SHIPPED | OUTPATIENT
Start: 2021-06-29 | End: 2021-06-30

## 2021-06-29 RX ADMIN — FERROUS SULFATE TAB 325 MG (65 MG ELEMENTAL FE) 325 MG: 325 (65 FE) TAB at 08:24

## 2021-06-29 RX ADMIN — IBUPROFEN 800 MG: 800 TABLET, FILM COATED ORAL at 10:45

## 2021-06-29 RX ADMIN — Medication 1 TABLET: at 08:24

## 2021-06-29 RX ADMIN — DOCUSATE SODIUM 100 MG: 100 CAPSULE, LIQUID FILLED ORAL at 08:24

## 2021-06-29 RX ADMIN — MAGNESIUM HYDROXIDE 10 ML: 2400 SUSPENSION ORAL at 11:34

## 2021-06-29 RX ADMIN — IBUPROFEN 800 MG: 800 TABLET, FILM COATED ORAL at 02:17

## 2021-06-30 LAB

## 2021-07-02 ENCOUNTER — TELEPHONE (OUTPATIENT)
Dept: OBSTETRICS AND GYNECOLOGY | Facility: CLINIC | Age: 41
End: 2021-07-02

## 2021-07-02 RX ORDER — VALACYCLOVIR HYDROCHLORIDE 500 MG/1
2000 TABLET, FILM COATED ORAL 2 TIMES DAILY
Qty: 8 TABLET | Refills: 0 | Status: SHIPPED | OUTPATIENT
Start: 2021-07-02 | End: 2021-07-03

## 2021-07-02 NOTE — TELEPHONE ENCOUNTER
Yes, they give it to infants exposed to herpes in the nursery/NICU all the time.    Thanks,   Dr. Demarco

## 2021-07-02 NOTE — TELEPHONE ENCOUNTER
Hello,  Patient calling to request valACYclovir (VALTREX) 500 MG tablet [[id number]] (Order [id number])    . Had outbreak after nose swab for COVID. Also wants if it is safe to while breastfeeding?    Pharmacy confirmed:  [id number] - [place], [place] - [address] AT [address] [place] [address] - [phone] [facility] [phone] FX

## 2021-07-02 NOTE — TELEPHONE ENCOUNTER
Jarrett Demarco,  Just to clarify... this medicine is safe for the patient to take while breastfeeding?   Thanks,  Arleen

## 2021-07-06 NOTE — PROGRESS NOTES
Continued Stay Note  [facility]     Patient Name: Laisha Alaniz  MRN: [id number]  Today's Date: 7/6/2021    Admit Date: 6/27/2021    Discharge Plan     Row Name 07/06/21 1107       Plan    Plan Comments  Mother: Laisha Alaniz, MRN [id number]; Infant: Glendy Alaniz, MRN [id number]. 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 A CPS report was made via web reporting, web ID # [id number]. ARMANDO Ellison        Discharge Codes    No documentation.       Expected Discharge Date and Time     Expected Discharge Date Expected Discharge Time    Jun 29, 2021             MALACHI Ellison

## 2021-07-19 ENCOUNTER — TELEPHONE (OUTPATIENT)
Dept: OBSTETRICS AND GYNECOLOGY | Facility: CLINIC | Age: 41
End: 2021-07-19

## 2021-07-19 NOTE — TELEPHONE ENCOUNTER
Patient said that she has had a family emergency and [place]. Will call to make a follow up appt.

## 2021-08-02 ENCOUNTER — POSTPARTUM VISIT (OUTPATIENT)
Dept: OBSTETRICS AND GYNECOLOGY | Facility: CLINIC | Age: 41
End: 2021-08-02

## 2021-08-02 VITALS
SYSTOLIC BLOOD PRESSURE: 153 MMHG | HEART RATE: 83 BPM | HEIGHT: 60 IN | WEIGHT: 163 LBS | DIASTOLIC BLOOD PRESSURE: 97 MMHG | BODY MASS INDEX: 32 KG/M2

## 2021-08-02 DIAGNOSIS — Z30.013 ENCOUNTER FOR INITIAL PRESCRIPTION OF INJECTABLE CONTRACEPTIVE: ICD-10-CM

## 2021-08-02 PROCEDURE — 0503F POSTPARTUM CARE VISIT: CPT | Performed by: OBSTETRICS & GYNECOLOGY

## 2021-08-02 RX ORDER — MEDROXYPROGESTERONE ACETATE 150 MG/ML
150 INJECTION, SUSPENSION INTRAMUSCULAR
Qty: 1 ML | Refills: 3 | Status: SHIPPED | OUTPATIENT
Start: 2021-08-02 | End: 2022-01-04

## 2021-08-02 NOTE — PROGRESS NOTES
"Here for Postpartum Check     HPI    41 y.o.  - Delivered 2021, now at 6 weeks postpartum for follow up.   Complications of pregnancy/delivery/postpartum :    Breastfeeding/Bottlefeeding : Breast/Bottle   Baby Blues: denies  Contraception : request Depo.   Last pap : 2019 NL HPV neg   Complaints: denies    Review of Systems   Constitutional: Negative for chills and fever.   Gastrointestinal: Negative for abdominal pain.   Genitourinary: Negative for dysuria, menstrual problem, pelvic pain, vaginal bleeding and vaginal discharge.   All other systems reviewed and are negative.      /97   Pulse 83   Ht 152.4 cm (60\")   Wt 73.9 kg (163 lb)   Breastfeeding Yes   BMI 31.83 kg/m²     Physical Exam  Constitutional:       General: She is not in acute distress.     Appearance: She is well-developed and normal weight.   Genitourinary:      Pelvic exam was performed with patient supine.      Vulva, vagina, right adnexa and left adnexa normal.      No vulval lesion or Bartholin's cyst noted.      No inguinal adenopathy present in the right or left side.     No vaginal discharge or bleeding.      No cervical motion tenderness or friability.      Uterus is enlarged (10-12 weeks) and irregular.      Uterus is not tender.      No uterine mass detected.     Uterus is anteverted and regular.      No right or left adnexal mass present.      Right adnexa not tender or full.      Left adnexa not tender or full.   HENT:      Head: Normocephalic and atraumatic.      Nose: Nose normal.   Eyes:      Conjunctiva/sclera: Conjunctivae normal.      Pupils: Pupils are equal, round, and reactive to light.   Neck:      Thyroid: No thyromegaly.   Cardiovascular:      Rate and Rhythm: Normal rate and regular rhythm.      Heart sounds: Normal heart sounds. No murmur heard.     Pulmonary:      Effort: Pulmonary effort is normal. No respiratory distress.      Breath sounds: Normal breath sounds.   Chest:      " Breasts:         Right: No inverted nipple, mass or nipple discharge.         Left: No inverted nipple, mass or nipple discharge.   Abdominal:      General: Abdomen is flat. There is no distension.      Palpations: Abdomen is soft.      Tenderness: There is no abdominal tenderness.   Musculoskeletal:         General: No deformity. Normal range of motion.      Cervical back: Normal range of motion and neck supple.      Right lower leg: No edema.      Left lower leg: No edema.   Lymphadenopathy:      Lower Body: No right inguinal adenopathy. No left inguinal adenopathy.   Neurological:      Mental Status: She is alert and oriented to person, place, and time.   Skin:     General: Skin is warm and dry.      Findings: No erythema.   Psychiatric:         Behavior: Behavior normal.         Thought Content: Thought content normal.         Judgment: Judgment normal.   Vitals reviewed. Exam conducted with a chaperone present.            Assessment/plan   Diagnoses and all orders for this visit:    1. Postpartum state (Primary)    2. Encounter for initial prescription of injectable contraceptive    Other orders  -     medroxyPROGESTERone (DEPO-PROVERA) 150 MG/ML injection; Inject 1 mL into the appropriate muscle as directed by prescriber Every 3 (Three) Months.  Dispense: 1 mL; Refill: 3      1) Postpartum   Released from restrictions   Recommendation : 150 min/week of moderate intensity aerobic activity   Check BP with PCP in next several months.     2) Depo-provera   How to start   Adjustment bleeding, amenorrhea, weight gain or duration of effects reviewed     Garcia Demarco MD   8/2/2021  16:37 EDT

## 2021-08-05 ENCOUNTER — CLINICAL SUPPORT (OUTPATIENT)
Dept: OBSTETRICS AND GYNECOLOGY | Facility: CLINIC | Age: 41
End: 2021-08-05

## 2021-08-05 DIAGNOSIS — Z30.013 ENCOUNTER FOR INITIAL PRESCRIPTION OF INJECTABLE CONTRACEPTIVE: Primary | ICD-10-CM

## 2021-08-05 PROCEDURE — 96372 THER/PROPH/DIAG INJ SC/IM: CPT | Performed by: OBSTETRICS & GYNECOLOGY

## 2021-08-05 RX ORDER — MEDROXYPROGESTERONE ACETATE 150 MG/ML
150 INJECTION, SUSPENSION INTRAMUSCULAR ONCE
Status: COMPLETED | OUTPATIENT
Start: 2021-08-05 | End: 2021-08-05

## 2021-08-05 RX ADMIN — MEDROXYPROGESTERONE ACETATE 150 MG: 150 INJECTION, SUSPENSION INTRAMUSCULAR at 13:31

## 2021-09-01 ENCOUNTER — TELEPHONE (OUTPATIENT)
Dept: OBSTETRICS AND GYNECOLOGY | Facility: CLINIC | Age: 41
End: 2021-09-01

## 2021-09-01 RX ORDER — ESTRADIOL 1 MG/1
1 TABLET ORAL DAILY
Qty: 7 TABLET | Refills: 0 | Status: SHIPPED | OUTPATIENT
Start: 2021-09-01 | End: 2021-09-07

## 2021-09-01 NOTE — TELEPHONE ENCOUNTER
Kaylynn,     We can try an short course of estrogen to help, but this is a normal adjustment of her body to the depo-provera.  It typically improves over time, but can take a few months. Nothing to do in the office.     Thanks,   Dr. Demarco

## 2021-09-01 NOTE — TELEPHONE ENCOUNTER
Good morning,  Patient is calling to see if there is something that she should do. Patient got her Depo injection on 8/5 and has been bleeding since 8/6. Patient would like to know if provider can call in something to help with this or if she should come into the office to be seen?  Patient denies cramping or clotting.  Patient stated that it is about a medium flow 1 pad every 4-5 hours.  Patient did stated she had a migraine yesterday that has dulled but still present today.      Please advise,  Thank you      Pharmacy confirmed-[facility] #[id number] - [place], [place] - [address] AT [facility] [address] - [phone] [facility] [phone] FX

## 2021-09-07 RX ORDER — ESTRADIOL 1 MG/1
1 TABLET ORAL DAILY
Qty: 7 TABLET | Refills: 0 | Status: SHIPPED | OUTPATIENT
Start: 2021-09-07 | End: 2021-09-13

## 2021-09-13 RX ORDER — ESTRADIOL 1 MG/1
1 TABLET ORAL DAILY
Qty: 7 TABLET | Refills: 0 | Status: SHIPPED | OUTPATIENT
Start: 2021-09-13 | End: 2021-09-17

## 2021-09-17 RX ORDER — ESTRADIOL 1 MG/1
1 TABLET ORAL DAILY
Qty: 7 TABLET | Refills: 0 | Status: SHIPPED | OUTPATIENT
Start: 2021-09-17 | End: 2022-01-04

## 2022-01-03 ENCOUNTER — TELEPHONE (OUTPATIENT)
Dept: OBSTETRICS AND GYNECOLOGY | Facility: CLINIC | Age: 42
End: 2022-01-03

## 2022-01-03 RX ORDER — VALACYCLOVIR HYDROCHLORIDE 1 G/1
2000 TABLET, FILM COATED ORAL 2 TIMES DAILY
Qty: 4 TABLET | Refills: 0 | Status: SHIPPED | OUTPATIENT
Start: 2022-01-03 | End: 2022-01-04

## 2022-01-03 NOTE — TELEPHONE ENCOUNTER
Pt states she is wanting a new rx for Valtrex for fever blisters. She states it has been a while since she used it and does not have the bottle to request a refill.

## 2022-01-04 ENCOUNTER — OFFICE VISIT (OUTPATIENT)
Dept: OBSTETRICS AND GYNECOLOGY | Facility: CLINIC | Age: 42
End: 2022-01-04

## 2022-01-04 VITALS
BODY MASS INDEX: 30.82 KG/M2 | HEIGHT: 60 IN | DIASTOLIC BLOOD PRESSURE: 72 MMHG | WEIGHT: 157 LBS | SYSTOLIC BLOOD PRESSURE: 119 MMHG

## 2022-01-04 DIAGNOSIS — N93.9 ABNORMAL UTERINE BLEEDING (AUB): ICD-10-CM

## 2022-01-04 DIAGNOSIS — N92.6 IRREGULAR MENSES: Primary | ICD-10-CM

## 2022-01-04 LAB
B-HCG UR QL: NEGATIVE
EXPIRATION DATE: NORMAL
INTERNAL NEGATIVE CONTROL: NEGATIVE
INTERNAL POSITIVE CONTROL: POSITIVE
Lab: NORMAL

## 2022-01-04 PROCEDURE — 81025 URINE PREGNANCY TEST: CPT | Performed by: NURSE PRACTITIONER

## 2022-01-04 PROCEDURE — 99213 OFFICE O/P EST LOW 20 MIN: CPT | Performed by: NURSE PRACTITIONER

## 2022-01-04 PROCEDURE — 58100 BIOPSY OF UTERUS LINING: CPT | Performed by: NURSE PRACTITIONER

## 2022-01-04 RX ORDER — ACETAMINOPHEN AND CODEINE PHOSPHATE 120; 12 MG/5ML; MG/5ML
1 SOLUTION ORAL DAILY
COMMUNITY

## 2022-01-04 RX ORDER — LISINOPRIL AND HYDROCHLOROTHIAZIDE 12.5; 1 MG/1; MG/1
1 TABLET ORAL DAILY
COMMUNITY
Start: 2021-12-29

## 2022-01-04 NOTE — PROGRESS NOTES
Chief Complaint   Patient presents with   • Menstrual Problem     Pt c/o abnormal periods since depo provera. Patient is no longer using depo provera. She is taking Micronor that she ordered on [facility] internet. Patient is still having irregular bleeding.         SUBJECTIVE:     Laisha Alaniz is a 41 y.o.  who presents with c/o abnormal periods since starting DMPA. She stopped DMPA in October and started micronor which she received via an online medical service. She is taking this this at the exact same time every day and denies missed doses. Periods are irregular, sometimes having 2 times a month. She also reports prolonged bleeding, occasionally lasting for up to 30 days. Bleeding is at times heavy, but she is not saturating a pad an hour on these days.  She is bleeding lightly today. Reports new onset postcoital bleeding. Hx of HTN, she sees PCP regularly. States that she recently had labs with PCP she thinks her thyroid may have been checked, but she is not sure. She has not received lab results yet. Seeing Dr. Doe.  This is a new problem. LMP 21.     She is a patient of Dr. Demarco's.    Past Medical History:   Diagnosis Date   • Abnormal Pap smear of cervix    • Chlamydia    • Gonorrhea    • Migraine    • Migraines    • MRSA (methicillin resistant Staphylococcus aureus) 2015   • PID (pelvic inflammatory disease)    • Varicella       Past Surgical History:   Procedure Laterality Date   •  SECTION     •  SECTION N/A 2021    Procedure:  SECTION REPEAT;  Surgeon: Garcia Demarco MD;  Location: [facility] LABOR DELIVERY;  Service: Obstetrics/Gynecology;  Laterality: N/A;   • WISDOM TOOTH EXTRACTION        Social History     Tobacco Use   • Smoking status: Current Every Day Smoker     Packs/day: 0.25     Types: Cigarettes   • Smokeless tobacco: Never Used   Substance Use Topics   • Alcohol use: No   • Drug use: No     OB History    Para Term  AB Living  "  5 5 5 0 0 5   SAB IAB Ectopic Molar Multiple Live Births   0 0 0 0 0 5      # Outcome Date GA Lbr Rolf/2nd Weight Sex Delivery Anes PTL Lv   5 Term 21 38w6d  3510 g (7 lb 11.8 oz) F CS-LTranv Spinal N MT      Birth Comments: Panda 1   4 Term 20 37w6d 14:01 / 00:21 3523 g (7 lb 12.3 oz) M  EPI N MT      Birth Comments: scale 4   3 Term 11/10/16 40w2d  3145 g (6 lb 14.9 oz) F CS-LTranv EPI N MT      Complications: Fetal Intolerance   2 Term 11/15/03 40w0d  3487 g (7 lb 11 oz) F Vag-Spont EPI  MT   1 Term 03 40w0d  3742 g (8 lb 4 oz) F Vag-Spont EPI  MT        Review of Systems   Constitutional: Negative for chills, fatigue and fever.   Gastrointestinal: Negative for abdominal distention and abdominal pain.   Genitourinary: Positive for menstrual problem. Negative for dyspareunia, dysuria, pelvic pain, vaginal bleeding, vaginal discharge and vaginal pain.        + postcoital spotting   Musculoskeletal: Negative for gait problem.       OBJECTIVE:   Vitals:    22 0911   BP: 119/72   Weight: 71.2 kg (157 lb)   Height: 152.4 cm (60\")        Physical Exam  Constitutional:       General: She is not in acute distress.     Appearance: Normal appearance. She is not ill-appearing.   Genitourinary:      Bladder and urethral meatus normal.      No lesions in the vagina.      Right Labia: No rash, tenderness, lesions, skin changes or Bartholin's cyst.     Left Labia: No tenderness, lesions, skin changes, Bartholin's cyst or rash.     No labial fusion noted.      No inguinal adenopathy present in the right or left side.     Vaginal bleeding (scant amount of oxidized blood) present.      No vaginal discharge, erythema, tenderness, ulceration or granulation tissue.      No vaginal prolapse present.     No vaginal atrophy present.       Right Adnexa: not tender, not full, not palpable, no mass present and not absent.     Left Adnexa: not tender, not full, not palpable, no mass present and not absent.   "   No cervical motion tenderness, discharge, friability, lesion, polyp, nabothian cyst or eversion.      Uterus is not enlarged, fixed, tender, irregular or prolapsed.      No uterine mass detected.  Cardiovascular:      Rate and Rhythm: Normal rate.      Pulses: Normal pulses.   Pulmonary:      Effort: Pulmonary effort is normal.   Abdominal:      General: There is no distension.      Palpations: Abdomen is soft. There is no mass.      Tenderness: There is no abdominal tenderness. There is no guarding.      Hernia: No hernia is present. There is no hernia in the left inguinal area or right inguinal area.   Musculoskeletal:         General: Normal range of motion.      Cervical back: Normal range of motion.   Lymphadenopathy:      Lower Body: No right inguinal adenopathy. No left inguinal adenopathy.   Neurological:      General: No focal deficit present.      Mental Status: She is alert and oriented to person, place, and time.      Cranial Nerves: No cranial nerve deficit.   Skin:     General: Skin is warm and dry.   Psychiatric:         Mood and Affect: Mood normal.         Behavior: Behavior normal.         Thought Content: Thought content normal.         Judgment: Judgment normal.   Vitals and nursing note reviewed.       Endometrial Biopsy Procedure Note    Pre-operative Diagnosis: AUB    Post-operative Diagnosis: Same    Indications: abnormal uterine bleeding    Procedure Details    Urine pregnancy test was done and was NEGATIVE .  The risks (including infection, bleeding, pain, and uterine perforation) and benefits of the procedure were explained to the patient and Verbal informed consent was obtained.  Antibiotic prophylaxis against endocarditis was not indicated.     A timeout procedure was performed.  The patient was placed in the dorsal lithotomy position.  Bimanual exam showed the uterus to be in the retroflexed position.  A Graves' speculum inserted in the vagina, and the cervix prepped with povidone  iodine.       A sharp tenaculum was applied to the anterior lip of the cervix for stabilization.   A Pipelle endometrial aspirator was introduced into the cervix. I was unable to advance the pipelle through the cervix. The pt became lightheaded and nauseous. The procedure was stopped at this time. She vomited following the procedure, but quickly began to feel better. No endometrial sample was obtained.     Condition:  Stable    Complications:  None    Plan:  Patient tolerated the procedure fairly well  The patient was advised to call for any fever or for prolonged or severe pain or bleeding. She was advised to use NSAID as needed for mild to moderate pain. She was advised to avoid vaginal intercourse for 48 hours or until the bleeding has completely stopped.  Assessment/Plan    Diagnoses and all orders for this visit:    1. Irregular menses (Primary)  -     POC Pregnancy, Urine  -     CBC & Differential  -     Hemoglobin A1c  -     Prolactin  -     TSH  -     NuSwab VG+ - Swab, Vagina  -     Cancel: Reference Histopathology  -     US Non-ob Transvaginal; Future    2. Abnormal uterine bleeding (AUB)      Negative urine Hcg in office today  Encouraged to continue POP, she is taking at the same time every day  Discussed possible causes of AUB, including but not limited to progesterone only contraceptives  She would like to complete a workup for AUB.   Attempts were made to complete EMB, however despite her recent delivery I was unable to advance through the cervix. She became light headed and nauseous during the procedure. Small amount of emesis. Symptoms improved. She left the office in no acute distress    Follow up: 1-2 weeks for TVUS and f/u with myself or Dr Demarco.       Idalmis Navarro, APRN  1/4/2022  12:25 EST

## 2022-01-05 LAB
BASOPHILS # BLD AUTO: 0 X10E3/UL (ref 0–0.2)
BASOPHILS NFR BLD AUTO: 0 %
EOSINOPHIL # BLD AUTO: 0.2 X10E3/UL (ref 0–0.4)
EOSINOPHIL NFR BLD AUTO: 2 %
ERYTHROCYTE [DISTWIDTH] IN BLOOD BY AUTOMATED COUNT: 12.7 % (ref 11.7–15.4)
HBA1C MFR BLD: 5.5 % (ref 4.8–5.6)
HCT VFR BLD AUTO: 43 % (ref 34–46.6)
HGB BLD-MCNC: 14 G/DL (ref 11.1–15.9)
IMM GRANULOCYTES # BLD AUTO: 0 X10E3/UL (ref 0–0.1)
IMM GRANULOCYTES NFR BLD AUTO: 0 %
LYMPHOCYTES # BLD AUTO: 2.9 X10E3/UL (ref 0.7–3.1)
LYMPHOCYTES NFR BLD AUTO: 31 %
MCH RBC QN AUTO: 29 PG (ref 26.6–33)
MCHC RBC AUTO-ENTMCNC: 32.6 G/DL (ref 31.5–35.7)
MCV RBC AUTO: 89 FL (ref 79–97)
MONOCYTES # BLD AUTO: 0.4 X10E3/UL (ref 0.1–0.9)
MONOCYTES NFR BLD AUTO: 4 %
NEUTROPHILS # BLD AUTO: 5.9 X10E3/UL (ref 1.4–7)
NEUTROPHILS NFR BLD AUTO: 63 %
PLATELET # BLD AUTO: 299 X10E3/UL (ref 150–450)
PROLACTIN SERPL-MCNC: 20.3 NG/ML (ref 4.8–23.3)
RBC # BLD AUTO: 4.83 X10E6/UL (ref 3.77–5.28)
TSH SERPL DL<=0.005 MIU/L-ACNC: 1.54 UIU/ML (ref 0.45–4.5)
WBC # BLD AUTO: 9.5 X10E3/UL (ref 3.4–10.8)

## 2022-01-06 LAB
A VAGINAE DNA VAG QL NAA+PROBE: NORMAL SCORE
BVAB2 DNA VAG QL NAA+PROBE: NORMAL SCORE
C ALBICANS DNA VAG QL NAA+PROBE: NEGATIVE
C GLABRATA DNA VAG QL NAA+PROBE: NEGATIVE
C TRACH DNA VAG QL NAA+PROBE: NEGATIVE
MEGA1 DNA VAG QL NAA+PROBE: NORMAL SCORE
N GONORRHOEA DNA VAG QL NAA+PROBE: NEGATIVE
T VAGINALIS DNA VAG QL NAA+PROBE: NEGATIVE

## (undated) DEVICE — ANTIBACTERIAL UNDYED BRAIDED (POLYGLACTIN 910), SYNTHETIC ABSORBABLE SUTURE: Brand: COATED VICRYL

## (undated) DEVICE — SUT MNCRYL 3/0 CT1 36 IN Y944H

## (undated) DEVICE — SOL IRR H2O BTL 1000ML STRL

## (undated) DEVICE — SUT PLAIN  3/0 CT1 27IN 842H

## (undated) DEVICE — SUT MNCRYL 0/0 CTX 36IN Y398H

## (undated) DEVICE — 3M™ STERI-STRIP™ REINFORCED ADHESIVE SKIN CLOSURES, R1547, 1/2 IN X 4 IN (12 MM X 100 MM), 6 STRIPS/ENVELOPE: Brand: 3M™ STERI-STRIP™

## (undated) DEVICE — 3M™ STERI-STRIP™ COMPOUND BENZOIN TINCTURE 40 BAGS/CARTON 4 CARTONS/CASE C1544: Brand: 3M™ STERI-STRIP™

## (undated) DEVICE — 3M(TM) TEGADERM(TM) TRANSPARENT FILM DRESSING FRAME STYLE 1627: Brand: 3M™ TEGADERM™

## (undated) DEVICE — GLV SURG BIOGEL LTX PF 7 1/2

## (undated) DEVICE — KENDALL SCD EXPRESS SLEEVES, KNEE LENGTH, MEDIUM: Brand: KENDALL SCD